# Patient Record
Sex: FEMALE | Race: WHITE | NOT HISPANIC OR LATINO | URBAN - METROPOLITAN AREA
[De-identification: names, ages, dates, MRNs, and addresses within clinical notes are randomized per-mention and may not be internally consistent; named-entity substitution may affect disease eponyms.]

---

## 2017-12-27 ENCOUNTER — EMERGENCY (EMERGENCY)
Facility: HOSPITAL | Age: 37
LOS: 1 days | Discharge: HOME | End: 2017-12-27
Attending: EMERGENCY MEDICINE | Admitting: EMERGENCY MEDICINE
Payer: MEDICAID

## 2017-12-27 VITALS
SYSTOLIC BLOOD PRESSURE: 125 MMHG | HEART RATE: 84 BPM | DIASTOLIC BLOOD PRESSURE: 78 MMHG | TEMPERATURE: 98 F | RESPIRATION RATE: 20 BRPM | OXYGEN SATURATION: 98 %

## 2017-12-27 VITALS
RESPIRATION RATE: 17 BRPM | SYSTOLIC BLOOD PRESSURE: 123 MMHG | OXYGEN SATURATION: 100 % | TEMPERATURE: 99 F | HEART RATE: 77 BPM | DIASTOLIC BLOOD PRESSURE: 75 MMHG

## 2017-12-27 DIAGNOSIS — S20.221A CONTUSION OF RIGHT BACK WALL OF THORAX, INITIAL ENCOUNTER: ICD-10-CM

## 2017-12-27 DIAGNOSIS — S39.92XA UNSPECIFIED INJURY OF LOWER BACK, INITIAL ENCOUNTER: ICD-10-CM

## 2017-12-27 DIAGNOSIS — F11.20 OPIOID DEPENDENCE, UNCOMPLICATED: ICD-10-CM
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
## 2017-12-27 DIAGNOSIS — Z88.0 ALLERGY STATUS TO PENICILLIN: ICD-10-CM

## 2017-12-27 DIAGNOSIS — Z79.899 OTHER LONG TERM (CURRENT) DRUG THERAPY: ICD-10-CM
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
## 2017-12-27 DIAGNOSIS — S32.008A OTHER FRACTURE OF UNSPECIFIED LUMBAR VERTEBRA, INITIAL ENCOUNTER FOR CLOSED FRACTURE: ICD-10-CM

## 2017-12-27 DIAGNOSIS — B19.20 UNSPECIFIED VIRAL HEPATITIS C WITHOUT HEPATIC COMA: ICD-10-CM

## 2017-12-27 DIAGNOSIS — S30.1XXA CONTUSION OF ABDOMINAL WALL, INITIAL ENCOUNTER: ICD-10-CM
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
## 2017-12-27 LAB
ALBUMIN SERPL ELPH-MCNC: 3.6 G/DL — SIGNIFICANT CHANGE UP (ref 3.4–5)
ALP SERPL-CCNC: 66 U/L — SIGNIFICANT CHANGE UP (ref 40–120)
ALT FLD-CCNC: 42 U/L — SIGNIFICANT CHANGE UP (ref 12–42)
ANION GAP SERPL CALC-SCNC: 7 MMOL/L — LOW (ref 9–16)
APPEARANCE UR: CLEAR — SIGNIFICANT CHANGE UP
APTT BLD: 26.8 SEC — LOW (ref 27.5–36.5)
AST SERPL-CCNC: 32 U/L — SIGNIFICANT CHANGE UP (ref 15–37)
BILIRUB SERPL-MCNC: 0.3 MG/DL — SIGNIFICANT CHANGE UP (ref 0.2–1.2)
BILIRUB UR-MCNC: NEGATIVE — SIGNIFICANT CHANGE UP
BUN SERPL-MCNC: 12 MG/DL — SIGNIFICANT CHANGE UP (ref 7–23)
CALCIUM SERPL-MCNC: 9.1 MG/DL — SIGNIFICANT CHANGE UP (ref 8.5–10.5)
CHLORIDE SERPL-SCNC: 104 MMOL/L — SIGNIFICANT CHANGE UP (ref 96–108)
CO2 SERPL-SCNC: 28 MMOL/L — SIGNIFICANT CHANGE UP (ref 22–31)
COLOR SPEC: YELLOW — SIGNIFICANT CHANGE UP
CREAT SERPL-MCNC: 0.98 MG/DL — SIGNIFICANT CHANGE UP (ref 0.5–1.3)
DIFF PNL FLD: NEGATIVE — SIGNIFICANT CHANGE UP
ETHANOL SERPL-MCNC: <3 MG/DL — SIGNIFICANT CHANGE UP
GLUCOSE SERPL-MCNC: 86 MG/DL — SIGNIFICANT CHANGE UP (ref 70–99)
GLUCOSE UR QL: NEGATIVE — SIGNIFICANT CHANGE UP
HCG UR QL: NEGATIVE — SIGNIFICANT CHANGE UP
HCT VFR BLD CALC: 38.3 % — SIGNIFICANT CHANGE UP (ref 34.5–45)
HGB BLD-MCNC: 12.6 G/DL — SIGNIFICANT CHANGE UP (ref 11.5–15.5)
INR BLD: 0.96 — SIGNIFICANT CHANGE UP (ref 0.88–1.16)
KETONES UR-MCNC: NEGATIVE — SIGNIFICANT CHANGE UP
LEUKOCYTE ESTERASE UR-ACNC: NEGATIVE — SIGNIFICANT CHANGE UP
MCHC RBC-ENTMCNC: 27.2 PG — SIGNIFICANT CHANGE UP (ref 27–34)
MCHC RBC-ENTMCNC: 32.9 G/DL — SIGNIFICANT CHANGE UP (ref 32–36)
MCV RBC AUTO: 82.5 FL — SIGNIFICANT CHANGE UP (ref 80–100)
NITRITE UR-MCNC: NEGATIVE — SIGNIFICANT CHANGE UP
PCP SPEC-MCNC: SIGNIFICANT CHANGE UP
PH UR: 6 — SIGNIFICANT CHANGE UP (ref 5–8)
PLATELET # BLD AUTO: 176 K/UL — SIGNIFICANT CHANGE UP (ref 150–400)
POTASSIUM SERPL-MCNC: 4.1 MMOL/L — SIGNIFICANT CHANGE UP (ref 3.5–5.3)
POTASSIUM SERPL-SCNC: 4.1 MMOL/L — SIGNIFICANT CHANGE UP (ref 3.5–5.3)
PROT SERPL-MCNC: 7.2 G/DL — SIGNIFICANT CHANGE UP (ref 6.4–8.2)
PROT UR-MCNC: 30 MG/DL
PROTHROM AB SERPL-ACNC: 10.6 SEC — SIGNIFICANT CHANGE UP (ref 9.8–12.7)
RBC # BLD: 4.64 M/UL — SIGNIFICANT CHANGE UP (ref 3.8–5.2)
RBC # FLD: 13.6 % — SIGNIFICANT CHANGE UP (ref 10.3–16.9)
SODIUM SERPL-SCNC: 139 MMOL/L — SIGNIFICANT CHANGE UP (ref 132–145)
SP GR SPEC: 1.01 — SIGNIFICANT CHANGE UP (ref 1–1.03)
UROBILINOGEN FLD QL: 0.2 E.U./DL — SIGNIFICANT CHANGE UP
WBC # BLD: 7.4 K/UL — SIGNIFICANT CHANGE UP (ref 3.8–10.5)
WBC # FLD AUTO: 7.4 K/UL — SIGNIFICANT CHANGE UP (ref 3.8–10.5)

## 2017-12-27 PROCEDURE — 99285 EMERGENCY DEPT VISIT HI MDM: CPT

## 2017-12-27 PROCEDURE — 74176 CT ABD & PELVIS W/O CONTRAST: CPT | Mod: 26

## 2017-12-27 RX ORDER — IBUPROFEN 200 MG
400 TABLET ORAL ONCE
Qty: 0 | Refills: 0 | Status: COMPLETED | OUTPATIENT
Start: 2017-12-27 | End: 2017-12-27

## 2017-12-27 RX ORDER — IOHEXOL 300 MG/ML
50 INJECTION, SOLUTION INTRAVENOUS ONCE
Qty: 0 | Refills: 0 | Status: COMPLETED | OUTPATIENT
Start: 2017-12-27 | End: 2017-12-27

## 2017-12-27 RX ORDER — SODIUM CHLORIDE 9 MG/ML
1000 INJECTION INTRAMUSCULAR; INTRAVENOUS; SUBCUTANEOUS ONCE
Qty: 0 | Refills: 0 | Status: COMPLETED | OUTPATIENT
Start: 2017-12-27 | End: 2017-12-27

## 2017-12-27 RX ADMIN — IOHEXOL 50 MILLILITER(S): 300 INJECTION, SOLUTION INTRAVENOUS at 14:46

## 2017-12-27 RX ADMIN — Medication 400 MILLIGRAM(S): at 18:06

## 2017-12-27 NOTE — ED ADULT NURSE NOTE - CHIEF COMPLAINT QUOTE
Pt complaining of back pain s/p fall on the train tracks on [place]. Pt states she is homeless and did not want to come to the ED that night. Pt has large bruise to right lower back. Pt takes Methadone daily with benzo use.

## 2017-12-27 NOTE — ED BEHAVIORAL HEALTH NOTE - BEHAVIORAL HEALTH NOTE
Sw was consulted to the Er to speak with the patient and the patients mother. The patient is a 37 year old homeless women with a Hx of benzodiazepine and heroine abuse, takes "[place]atem[place] version of methadone" to help detox from methadone. The patients family was able to get in touch with her and brought her to the Er for her safety and the patient states that she also feel on the subway tracks and has injured her back. The patient and her family are interested in a detox program for the benzodiazepine and the patient only wants to go to [facility] for the program. This worker called the detox program at [place] and the coordinator stated that there was a 50/50 chance that she would get a bed if she went today and that it would be better if she went tomorrow at 7:30am as he mostly would be able to get a bed. This information was shared with the patient and her parents who were thankful for the information. The patient and her family were also provided with a list of other clinics and rehabs that would accept her. Worker made available for any other further assistance needed.

## 2017-12-27 NOTE — ED PROVIDER NOTE - MEDICAL DECISION MAKING DETAILS
Fracture of transverse process of L$ minimally displaced on CT scan, fell on train tracks a few days ago, methadone dependent, social work saw patient and she will go to rehab at 7:30am at [facility].  D/w ortho, outpatient follow up.

## 2017-12-27 NOTE — ED PROVIDER NOTE - OBJECTIVE STATEMENT
Pt is a 36 y/o F (PMHx: Hep c, benzodiazeprene & heroine abuse) c/o fall. Pt is undomiciled, states she [place] the subway tracks on [place] and didn't want to go the hospital because she states "saved money to get a hotel room for the night". Pt reports feeling a snap at impact and reports a burning sensation on her R mid back. Pt has a past Hx of benzodiazepine and heroine abuse, takes "Welsh version of methadone: dolophinexd" and states an allergic reaction to penicillin. Pt is a 36 y/o F (PMHx: Hep c, benzodiazeprene & heroine abuse) c/o fall. Pt is undomiciled, states she fell on the subway tracks on X[place] and didn't want to go the hospital because she states "saved money to get a hotel room for the night". Pt reports feeling a snap at impact and reports a burning sensation on her R mid back. Pt has a past Hx of benzodiazepine and heroine abuse, takes "Colombian version of methadone: Dolophine" and states an allergic reaction to penicillin.

## 2017-12-27 NOTE — ED ADULT NURSE NOTE - OBJECTIVE STATEMENT
Pt came in c/o right back pain s/p trip and fall in train tracks x2days ago, unknown LOC, bystanders pulled her out. Pt reports being homeless and taking methadone daily. Pt reports not being able to go to methadone clinic and last used IV heroin last night. Mother and father are at bedside requesting transfer to detox center. Pt presents to ED sleepy, cooperative, has large bruise on the right flank area.

## 2017-12-27 NOTE — ED ADULT NURSE NOTE - CHPI ED SYMPTOMS NEG
no confusion/no fever/no tingling/no weakness/no loss of consciousness/no abrasion/no bleeding/no deformity/no vomiting/no numbness

## 2017-12-27 NOTE — ED PROVIDER NOTE - CARE PLAN
Principal Discharge DX:	Closed fracture of lumbar vertebra without spinal cord injury, initial encounter  Secondary Diagnosis:	Hepatitis C  Secondary Diagnosis:	Methadone dependence

## 2017-12-27 NOTE — ED ADULT TRIAGE NOTE - CHIEF COMPLAINT QUOTE
Pt complaining of back pain s/p fall on the train tracks on [place] Day. Pt states she is homeless and did not want to come to the ED that night. Pt has large bruise to right lower back. Pt takes Methadone daily with benzo use.

## 2018-02-09 ENCOUNTER — HOSPITAL ENCOUNTER (INPATIENT)
Dept: HOSPITAL 74 - YASAS | Age: 38
LOS: 4 days | Discharge: OTHER INSTITUTION | DRG: 773 | End: 2018-02-13
Attending: INTERNAL MEDICINE | Admitting: INTERNAL MEDICINE
Payer: COMMERCIAL

## 2018-02-09 VITALS — BODY MASS INDEX: 26.6 KG/M2

## 2018-02-09 DIAGNOSIS — F19.24: ICD-10-CM

## 2018-02-09 DIAGNOSIS — G47.00: ICD-10-CM

## 2018-02-09 DIAGNOSIS — F14.20: ICD-10-CM

## 2018-02-09 DIAGNOSIS — F11.20: ICD-10-CM

## 2018-02-09 DIAGNOSIS — F17.210: ICD-10-CM

## 2018-02-09 DIAGNOSIS — Z88.0: ICD-10-CM

## 2018-02-09 DIAGNOSIS — F13.230: Primary | ICD-10-CM

## 2018-02-09 PROCEDURE — HZ2ZZZZ DETOXIFICATION SERVICES FOR SUBSTANCE ABUSE TREATMENT: ICD-10-PCS | Performed by: INTERNAL MEDICINE

## 2018-02-09 RX ADMIN — Medication SCH MG: at 22:39

## 2018-02-09 NOTE — HP
CIWA Score





- CIWA Score


Nausea/Vomitin-Mild Nausea/No Vomiting


Muscle Tremors: 4-Moderate,w/Arms Extend


Anxiety: 4-Mod. Anxious/Guarded


Agitation: 4-Moderately Restless


Paroxysmal Sweats: 1-Minimal Palms Moist


Orientation: 0-Oriented


Tacttile Disturbances: 0-None


Auditory Disturbances: 0-None


Visual Disturbances: 0-None


Headache: 2-Mild


CIWA-Ar Total Score: 16





Admission ROS [facility]





- HPI


Chief Complaint: 





C/O WITHDRAWAL SX'S FROM BENZO'S. SEEKING DETOX TXMENT


Allergies/Adverse Reactions: 


 Allergies











Allergy/AdvReac Type Severity Reaction Status Date / Time


 


buspirone [From BuSpar] Allergy Severe Rash Verified 18 20:05


 


Penicillins Allergy Severe Rash Verified 18 20:05











History of Present Illness: 





37 Y.O. FEMALE WITH LONG HX/O BENZO DEPENDENCE ADMITTED TO DETOX. CLIENT 

REPORTS LAST DETOX A LITTLE OVER A MONTH AGO BUT HAS SINCE RELAPSED. SHE WAS 

REFERRED BY HER MMTP PROGRAM. SHE IS ON METHADONE 100 MG DAILY AT THE [facility] METHADONE PROGRAM/ [facility] TODAY. REPORTS LONGEST CLEAN TIME 14 MONTHS.


Exam Limitations: No Limitations





- Ebola screening


Have you traveled outside of the country in the last 21 days: No


Have you had contact with anyone from an Ebola affected area: No


Have you been sick,other than usual withdrawal symptoms: No


Do you have a fever: No





- Review of Systems


Constitutional: Chills, Loss of Appetite, Malaise, Night Sweats, Unintentional 

Wgt. Loss


EENT: reports: Nose Congestion


Respiratory: reports: No Symptoms reported


Cardiac: reports: No Symptoms Reported


GI: reports: Poor Appetite, Poor Fluid Intake, Abdominal cramping


: reports: No Symptoms Reported


Musculoskeletal: reports: No Symptoms Reported


Integumentary: reports: No Symptoms Reported


Neuro: reports: No Symptoms reported


Endocrine: reports: No Symptoms Reported


Hematology: reports: No Symptoms Reported


Psychiatric: reports: Anxious, Depressed


Other Systems: Reviewed and Negative





Patient History





- Patient Medical History


Hx Anemia: No


Hx Asthma: No


Hx Chronic Obstructive Pulmonary Disease (COPD): No


Hx Cancer: No


Hx Cardiac Disorders: No


Hx Congestive Heart Failure: No


Hx Hypertension: No


Hx Hypercholesterolemia: No


Hx Pacemaker: No


HX Cerebrovascular Accident: No


Hx Seizures: No


Hx Dementia: No


Hx Diabetes: No


Hx Gastrointestinal Disorders: No


Hx Liver Disease: No


Hx Genitourinary Disorders: No


Hx Sexually Transmitted Disorders: No


Hx Renal Disease (ESRD): No


Hx Thyroid Disease: No


Hx Human Immunodeficiency Virus (HIV): No


Hx Hepatitis C: Yes (TX'ED)


Hx Depression: Yes


Hx Suicide Attempt: No


Hx Bipolar Disorder: No


Hx Schizophrenia: No


Other Medical History: ANXIETY





- Patient Surgical History


Past Surgical History: No


Hx Neurologic Surgery: No


Hx Cataract Extraction: No


Hx Cardiac Surgery: No


Hx Lung Surgery: No


Hx Breast Surgery: No


Hx Breast Biopsy: No


Hx Abdominal Surgery: No


Hx Appendectomy: No


Hx Cholecystectomy: No


Hx Genitourinary Surgery: No


Hx  Section: No


Hx Orthopedic Surgery: No


Anesthesia Reaction: No





- PPD History


Previous Implant?: Yes


Documented Results: Negative w/o proof


Implanted On Prior R Admission?: No


PPD to be Administered?: Yes





- Reproductive History


Patient is a Female of Child Bearing Age (11 -55 yrs old): Yes


Last Menstrual Period: 18


LMP comment: IRREG


Patient Pregnant: No (NEG [facility]HCG)





- Smoking Cessation


Smoking history: Current every day smoker


Have you smoked in the past 12 months: Yes


Aproximately how many cigarettes per day: 20


Cigars Per Day: 0


Hx Chewing Tobacco Use: No


Initiated information on smoking cessation: Yes


'Breaking Loose' booklet given: 18





- Substance & Tx. History


Hx Alcohol Use: No


Hx Substance Use: Yes


Substance Use Type: Cocaine, Opiates, Prescribed (MMTP), Tranquilizers (BENZO)


Hx Substance Use Treatment: Yes ([facility])





- Substances Abused


  ** Benzodiazepine (Klonopin)


Route: Oral


Frequency: Daily


Amount used: 10mg


Age of first use: 29


Date of Last Use: 18





  ** Cocaine


Route: Injection


Frequency: Daily


Amount used: 2 bags


Age of first use: 19


Date of Last Use: 18





Family Disease History





- Family Disease History


Family Disease History: Diabetes: Grandparent (HTN), Mother (HTN), Other: 

Father (RECOVERING ADDICT)





Admission Physical Exam [facility]





- Vital Signs


Vital Signs: 


 Vital Signs - 24 hr











  18





  18:04


 


Temperature 98.4 F


 


Pulse Rate 79


 


Respiratory 18





Rate 


 


Blood Pressure 137/72














- Physical


General Appearance: Yes: Disheveled, Mild Distress, Anxious


HEENTM: Yes: EOMI, Normocephalic, Normal Voice, JEF, Pharynx Normal, Nasal 

Congestion


Respiratory: Yes: Chest Non-Tender, Lungs Clear, Normal Breath Sounds, No 

Respiratory Distress, No Accessory Muscle Use


Neck: Yes: No masses,lesions,Nodules, Supple, Trachea in good position


Breast: Yes: Breast Exam Deferred


Cardiology: Yes: Regular Rhythm, Regular Rate, S1, S2


Abdominal: Yes: Normal Bowel Sounds, Non Tender, Flat, Soft


Genitourinary: Yes: Within Normal Limits


Back: Yes: Normal Inspection


Musculoskeletal: Yes: full range of Motion, Gait Steady


Extremities: Yes: Normal Range of Motion, Non-Tender


Neurological: Yes: CNs II-XII NML intact, Fully Oriented, Alert, Motor Strength 

5/5


Integumentary: Yes: Normal Color, Dry, Warm


Lymphatic: Yes: Within Normal Limits





- Diagnostic


(1) Sedative, hypnotic or anxiolytic dependence with withdrawal, uncomplicated


Current Visit: Yes   Status: Chronic   





(2) Cocaine dependence, uncomplicated


Current Visit: Yes   Status: Chronic   





(3) Methadone maintenance therapy patient


Current Visit: Yes   Status: Chronic   





(4) Nicotine dependence


Current Visit: Yes   Status: Chronic   


Qualifiers: 


   Nicotine product type: cigarettes   Substance use status: uncomplicated   

Qualified Code(s): F17.210 - Nicotine dependence, cigarettes, uncomplicated   





Cleared for Admission [facility]





- Detox or Rehab


[facility] Level of Care: Medically Managed


Detox Regimen/Protocol: Valium


Claeared for Rehab Admission: No





[facility] Breath Alcohol Content


Breath Alcohol Content: 0





Urine Pregancy Test





- Result


Urine Pregnancy Test Results: Negative- NO Line Present





Urine Drug Screen





- Results


Drug Screen Negative: No


Urine Drug Screen Results: VIRIDIANA-Cocaine, OPI-Opiates, BZO-Benzodiazepines, MTD-

Methadone

## 2018-02-10 LAB
APPEARANCE UR: (no result)
BILIRUB UR STRIP.AUTO-MCNC: NEGATIVE MG/DL
COLOR UR: YELLOW
EPITH CASTS URNS QL MICRO: (no result) /HPF
KETONES UR QL STRIP: NEGATIVE
LEUKOCYTE ESTERASE UR QL STRIP.AUTO: (no result)
MUCOUS THREADS URNS QL MICRO: (no result)
NITRITE UR QL STRIP: NEGATIVE
PH UR: 5 [PH] (ref 5–8)
PROT UR QL STRIP: NEGATIVE
PROT UR QL STRIP: NEGATIVE
RBC # UR STRIP: (no result) /UL
SP GR UR: 1.02 (ref 1–1.03)
UROBILINOGEN UR STRIP-MCNC: 2 MG/DL (ref 0.2–1)

## 2018-02-10 RX ADMIN — Medication SCH TAB: at 10:49

## 2018-02-10 RX ADMIN — Medication SCH MG: at 22:52

## 2018-02-10 RX ADMIN — NICOTINE POLACRILEX PRN MG: 2 GUM, CHEWING ORAL at 19:00

## 2018-02-10 RX ADMIN — NICOTINE SCH MG: 21 PATCH TRANSDERMAL at 13:33

## 2018-02-10 RX ADMIN — NICOTINE POLACRILEX PRN MG: 2 GUM, CHEWING ORAL at 13:18

## 2018-02-10 RX ADMIN — ZOLPIDEM TARTRATE PRN MG: 5 TABLET, COATED ORAL at 22:52

## 2018-02-10 NOTE — CONSULT
[facility] Psychiatric Consult





- Data


Date of interview: 02/10/18


Admission source: [facility]


Identifying data: Admission to [facility] for this 36 y/o  female 

seeking detox treatment on [address] for opiod,cocaine and benzodiazepine 

dependence.Patient is single without children,homeless,unemployed and supported 

on food stamps.


Substance Abuse History: Confirmed by patient in this session.See current [facility] 

report for details. Smoking history: Current every day smoker.  Have you smoked 

in the past 12 months: Yes.  Aproximately how many cigarettes per day: 20.  

Cigars Per Day: 0.  Hx Chewing Tobacco Use: No.  Initiated information on 

smoking cessation: Yes.  'Breaking Loose' booklet given: 02/09/18.  - Substance 

& Tx. History.  Hx Alcohol Use: No.  Hx Substance Use: Yes.  Substance Use Type

: Cocaine, Opiates, Prescribed (MMTP), Tranquilizers (BENZO).  Hx Substance Use 

Treatment: Yes ([facility]).  - Substances Abused.  ** Benzodiazepine (

Klonopin).  Route: Oral.  Frequency: Daily.  Amount used: 10mg.  Age of first 

use: 29.  Date of Last Use: 02/09/18.  ** Cocaine.  Route: Injection.  Frequency

: Daily.  Amount used: 2 bags.  Age of first use: 19.  Date of Last Use: 02/09/ 18


Medical History: Hepatitis C.


Psychiatric History: No reported history of psychiatric 

hospitalizations.Diagnosed with MDD and Anxiety Disorder.No regular OPD follow 

up (patient gets scripts at discharge from various detox/ rehab facilities)

.Prescribed zoloft 100 mg/day + benadryl for insomnia.Ms Jamarcus yan history 

of suicide attempts.Currently on methadone maintenance (100 mg/day) at the 

[facility].


Physical/Sexual Abuse/Trauma History: Patient denies.


Additional Comment: Urine Drug Screen Results: VIRIDIANA-Cocaine, OPI-Opiates, BZO-

Benzodiazepines, MTD-Methadone.Noted.





Mental Status Exam





- Mental Status Exam


Alert and Oriented to: Time, Place, Person


Cognitive Function: Good


Patient Appearance: Well Groomed


Mood: Nervous, Anxious, Hopeful


Affect: Mood Congruent


Patient Behavior: Fatigued, Appropriate, Cooperative


Speech Pattern: Clear, Appropriate


Voice Loudness: Normal


Thought Process: Intact, Goal Oriented


Thought Disorder: Not Present


Hallucinations: Denies


Suicidal Ideation: Denies


Homicidal Ideation: Denies


Insight/Judgement: Poor


Sleep: Poorly, Difficulty falling asleep


Appetite: Good


Muscle strength/Tone: Normal


Gait/Station: Normal





Psychiatric Findings





- Problem List (Axis 1, 2,3)


(1) Opioid dependence on agonist therapy


Current Visit: Yes   Status: Acute   





(2) Cocaine dependence, uncomplicated


Current Visit: Yes   Status: Acute   





(3) Sedative, hypnotic or anxiolytic dependence with withdrawal, uncomplicated


Current Visit: Yes   Status: Acute   





(4) Nicotine dependence


Current Visit: Yes   Status: Acute   


Qualifiers: 


   Nicotine product type: cigarettes   Substance use status: uncomplicated   

Qualified Code(s): F17.210 - Nicotine dependence, cigarettes, uncomplicated   





(5) Substance induced mood disorder


Current Visit: Yes   Status: Acute   





(6) Insomnia


Current Visit: Yes   Status: Acute   





- Initial Treatment Plan


Initial Treatment Plan: Psychoeducation.Sleep hygiene.Detoxification in 

progress.Medications : zoloft 100 mg po daily + ambien 5 mg po hs.Side effects/

benefits of both drugs are discussed with patient.Ms Ricketts consented (verbally

) to this careplan.Observation.

## 2018-02-10 NOTE — EKG
Test Reason : 

Blood Pressure : ***/*** mmHG

Vent. Rate : 054 BPM     Atrial Rate : 054 BPM

   P-R Int : 110 ms          QRS Dur : 096 ms

    QT Int : 466 ms       P-R-T Axes : 031 028 022 degrees

   QTc Int : 441 ms

 

SINUS BRADYCARDIA WITH SHORT MT

OTHERWISE NORMAL ECG

NO PREVIOUS ECGS AVAILABLE

Confirmed by LUIS EDUARDO GIMENEZ, KANDI ([id number]) on 2/10/2018 3:59:34 PM

 

Referred By:             Confirmed By:KANDI HOFF MD

## 2018-02-10 NOTE — PN
S CIWA





- CIWA Score


Nausea/Vomiting: 3


Muscle Tremors: 3


Anxiety: 3


Agitation: 3


Paroxysmal Sweats: 1-Minimal Palms Moist


Orientation: 0-Oriented


Tacttile Disturbances: 0-None


Auditory Disturbances: 0-None


Visual Disturbances: 0-None


Headache: 0-None Present


CIWA-Ar Total Score: 13





[facility] Progress Note (SOAP)


Subjective: 





Shakes


nausea


Objective: 





A & O x 3,


Ambulating steadily on unit


Anxious


 Vital Signs











Temperature  98.1 F   02/10/18 18:40


 


Pulse Rate  59 L  02/10/18 18:40


 


Respiratory Rate  17   02/10/18 18:40


 


Blood Pressure  113/61   02/10/18 18:40


 


O2 Sat by Pulse Oximetry (%)      








 Laboratory Last Values











Urine Color  Yellow   02/09/18  22:59    


 


Urine Appearance  Slcloudy   02/09/18  22:59    


 


Urine pH  5.0  (5.0-8.0)   02/09/18  22:59    


 


Ur Specific Gravity  1.018  (1.001-1.035)   02/09/18  22:59    


 


Urine Protein  Negative  (NEGATIVE)   02/09/18  22:59    


 


Urine Glucose (UA)  Negative  (NEGATIVE)   02/09/18  22:59    


 


Urine Ketones  Negative  (NEGATIVE)   02/09/18  22:59    


 


Urine Blood  1+  (NEGATIVE)  H  02/09/18  22:59    


 


Urine Nitrite  Negative  (NEGATIVE)   02/09/18  22:59    


 


Urine Bilirubin  Negative  (NEGATIVE)   02/09/18  22:59    


 


Urine Urobilinogen  2.0 mg/dL (0.2-1.0)  H  02/09/18  22:59    


 


Ur Leukocyte Esterase  Trace  (NEGATIVE)   02/09/18  22:59    


 


Urine WBC (Auto)  1 /hpf (3-5)   02/09/18  22:59    


 


Urine RBC (Auto)  6 /hpf (0-3)   02/09/18  22:59    


 


Ur Epithelial Cells  Few /HPF (FEW)   02/09/18  22:59    


 


Urine Mucus  Rare   02/09/18  22:59    








labs noted





Assessment: 





02/10/18 19:52


withdrawal sx


Plan: 





continue detox

## 2018-02-11 LAB
ALBUMIN SERPL-MCNC: 3.4 G/DL (ref 3.4–5)
ALP SERPL-CCNC: 71 U/L (ref 45–117)
ALT SERPL-CCNC: 23 U/L (ref 12–78)
ANION GAP SERPL CALC-SCNC: 4 MMOL/L (ref 8–16)
AST SERPL-CCNC: 21 U/L (ref 15–37)
BILIRUB SERPL-MCNC: 0.5 MG/DL (ref 0.2–1)
BUN SERPL-MCNC: 9 MG/DL (ref 7–18)
CALCIUM SERPL-MCNC: 8.4 MG/DL (ref 8.5–10.1)
CHLORIDE SERPL-SCNC: 105 MMOL/L (ref 98–107)
CO2 SERPL-SCNC: 31 MMOL/L (ref 21–32)
CREAT SERPL-MCNC: 1 MG/DL (ref 0.55–1.02)
DEPRECATED RDW RBC AUTO: 15.4 % (ref 11.6–15.6)
GLUCOSE SERPL-MCNC: 85 MG/DL (ref 74–106)
HCT VFR BLD CALC: 38 % (ref 32.4–45.2)
HGB BLD-MCNC: 12.2 GM/DL (ref 10.7–15.3)
MCH RBC QN AUTO: 26.6 PG (ref 25.7–33.7)
MCHC RBC AUTO-ENTMCNC: 32 G/DL (ref 32–36)
MCV RBC: 82.9 FL (ref 80–96)
PLATELET # BLD AUTO: 163 K/MM3 (ref 134–434)
PMV BLD: 8.8 FL (ref 7.5–11.1)
POTASSIUM SERPLBLD-SCNC: 4.3 MMOL/L (ref 3.5–5.1)
PROT SERPL-MCNC: 6.3 G/DL (ref 6.4–8.2)
RBC # BLD AUTO: 4.58 M/MM3 (ref 3.6–5.2)
SODIUM SERPL-SCNC: 140 MMOL/L (ref 136–145)
WBC # BLD AUTO: 5.2 K/MM3 (ref 4–10)

## 2018-02-11 RX ADMIN — METHADONE HYDROCHLORIDE SCH MG: 40 TABLET ORAL at 06:24

## 2018-02-11 RX ADMIN — ZOLPIDEM TARTRATE PRN MG: 5 TABLET, COATED ORAL at 22:45

## 2018-02-11 RX ADMIN — IBUPROFEN PRN MG: 400 TABLET, FILM COATED ORAL at 12:54

## 2018-02-11 RX ADMIN — Medication SCH TAB: at 10:45

## 2018-02-11 RX ADMIN — NICOTINE POLACRILEX PRN MG: 2 GUM, CHEWING ORAL at 10:47

## 2018-02-11 RX ADMIN — NICOTINE POLACRILEX PRN MG: 2 GUM, CHEWING ORAL at 15:04

## 2018-02-11 RX ADMIN — NICOTINE SCH MG: 21 PATCH TRANSDERMAL at 10:46

## 2018-02-11 RX ADMIN — Medication SCH MG: at 22:45

## 2018-02-11 RX ADMIN — SERTRALINE HYDROCHLORIDE SCH MG: 50 TABLET ORAL at 10:45

## 2018-02-11 RX ADMIN — NICOTINE POLACRILEX PRN MG: 2 GUM, CHEWING ORAL at 07:58

## 2018-02-11 NOTE — PN
[facility] CIWA





- CIWA Score


Nausea/Vomitin-No Nausea/No Vomiting


Muscle Tremors: 3


Anxiety: 3


Agitation: 3


Paroxysmal Sweats: 1-Minimal Palms Moist


Orientation: 0-Oriented


Tacttile Disturbances: 0-None


Auditory Disturbances: 0-None


Visual Disturbances: 0-None


Headache: 0-None Present


CIWA-Ar Total Score: 10





[facility] Progress Note (SOAP)


Subjective: 





sweat


tremor


anxiety


Objective: 





18 11:57


 Vital Signs











Temperature  98.1 F   18 10:21


 


Pulse Rate  49 L  18 10:21


 


Respiratory Rate  20   18 10:21


 


Blood Pressure  119/68   18 10:21


 


O2 Sat by Pulse Oximetry (%)      








 Laboratory Last Values











WBC  5.2 K/mm3 (4.0-10.0)   18  08:00    


 


RBC  4.58 M/mm3 (3.60-5.2)   18  08:00    


 


Hgb  12.2 GM/dL (10.7-15.3)   18  08:00    


 


Hct  38.0 % (32.4-45.2)   18  08:00    


 


MCV  82.9 fl (80-96)   18  08:00    


 


MCH  26.6 pg (25.7-33.7)   18  08:00    


 


MCHC  32.0 g/dl (32.0-36.0)   18  08:00    


 


RDW  15.4 % (11.6-15.6)   18  08:00    


 


Plt Count  163 K/MM3 (134-434)   18  08:00    


 


MPV  8.8 fl (7.5-11.1)   18  08:00    


 


Sodium  140 mmol/L (136-145)   18  08:00    


 


Potassium  4.3 mmol/L (3.5-5.1)   18  08:00    


 


Chloride  105 mmol/L ()   18  08:00    


 


Carbon Dioxide  31 mmol/L (21-32)   18  08:00    


 


Anion Gap  4  (8-16)  L  18  08:00    


 


BUN  9 mg/dL (7-18)   18  08:00    


 


Creatinine  1.0 mg/dL (0.55-1.02)   18  08:00    


 


Creat Clearance w eGFR  > 60  (>60)   18  08:00    


 


Random Glucose  85 mg/dL ()   18  08:00    


 


Calcium  8.4 mg/dL (8.5-10.1)  L  18  08:00    


 


Total Bilirubin  0.5 mg/dL (0.2-1.0)   18  08:00    


 


AST  21 U/L (15-37)   18  08:00    


 


ALT  23 U/L (12-78)   18  08:00    


 


Alkaline Phosphatase  71 U/L ()   18  08:00    


 


Total Protein  6.3 g/dl (6.4-8.2)  L  18  08:00    


 


Albumin  3.4 g/dl (3.4-5.0)   18  08:00    


 


Urine Color  Yellow   18  22:59    


 


Urine Appearance  Slcloudy   18  22:59    


 


Urine pH  5.0  (5.0-8.0)   18  22:59    


 


Ur Specific Gravity  1.018  (1.001-1.035)   18  22:59    


 


Urine Protein  Negative  (NEGATIVE)   18  22:59    


 


Urine Glucose (UA)  Negative  (NEGATIVE)   18  22:59    


 


Urine Ketones  Negative  (NEGATIVE)   18  22:59    


 


Urine Blood  1+  (NEGATIVE)  H  18  22:59    


 


Urine Nitrite  Negative  (NEGATIVE)   18  22:59    


 


Urine Bilirubin  Negative  (NEGATIVE)   18  22:59    


 


Urine Urobilinogen  2.0 mg/dL (0.2-1.0)  H  18  22:59    


 


Ur Leukocyte Esterase  Trace  (NEGATIVE)   18  22:59    


 


Urine WBC (Auto)  1 /hpf (3-5)   18  22:59    


 


Urine RBC (Auto)  6 /hpf (0-3)   18  22:59    


 


Ur Epithelial Cells  Few /HPF (FEW)   18  22:59    


 


Urine Mucus  Rare   18  22:59    








lab noted


Assessment: 





18 11:57


withdrawal sx


Plan: 





continue detox

## 2018-02-12 RX ADMIN — NICOTINE POLACRILEX PRN MG: 2 GUM, CHEWING ORAL at 10:59

## 2018-02-12 RX ADMIN — Medication SCH MG: at 22:28

## 2018-02-12 RX ADMIN — NICOTINE POLACRILEX PRN MG: 2 GUM, CHEWING ORAL at 08:40

## 2018-02-12 RX ADMIN — METHADONE HYDROCHLORIDE SCH MG: 40 TABLET ORAL at 05:30

## 2018-02-12 RX ADMIN — NICOTINE SCH MG: 21 PATCH TRANSDERMAL at 10:57

## 2018-02-12 RX ADMIN — Medication SCH TAB: at 10:57

## 2018-02-12 RX ADMIN — NICOTINE POLACRILEX PRN MG: 2 GUM, CHEWING ORAL at 12:59

## 2018-02-12 RX ADMIN — ZOLPIDEM TARTRATE PRN MG: 5 TABLET, COATED ORAL at 22:28

## 2018-02-12 RX ADMIN — SERTRALINE HYDROCHLORIDE SCH MG: 50 TABLET ORAL at 10:57

## 2018-02-12 RX ADMIN — NICOTINE POLACRILEX PRN MG: 2 GUM, CHEWING ORAL at 17:56

## 2018-02-12 NOTE — PN
[facility] Progress Note (SOAP)


Subjective: 





mild alcohol withdrawal sx


mild sweat


less anxiousness


no tremor





Objective: 





02/12/18 08:54


 Vital Signs











Temperature  97.7 F   02/12/18 06:38


 


Pulse Rate  50 L  02/12/18 06:38


 


Respiratory Rate  18   02/12/18 06:38


 


Blood Pressure  116/76   02/12/18 06:38


 


O2 Sat by Pulse Oximetry (%)      








 Laboratory Last Values











WBC  5.2 K/mm3 (4.0-10.0)   02/11/18  08:00    


 


RBC  4.58 M/mm3 (3.60-5.2)   02/11/18  08:00    


 


Hgb  12.2 GM/dL (10.7-15.3)   02/11/18  08:00    


 


Hct  38.0 % (32.4-45.2)   02/11/18  08:00    


 


MCV  82.9 fl (80-96)   02/11/18  08:00    


 


MCH  26.6 pg (25.7-33.7)   02/11/18  08:00    


 


MCHC  32.0 g/dl (32.0-36.0)   02/11/18  08:00    


 


RDW  15.4 % (11.6-15.6)   02/11/18  08:00    


 


Plt Count  163 K/MM3 (134-434)   02/11/18  08:00    


 


MPV  8.8 fl (7.5-11.1)   02/11/18  08:00    


 


Sodium  140 mmol/L (136-145)   02/11/18  08:00    


 


Potassium  4.3 mmol/L (3.5-5.1)   02/11/18  08:00    


 


Chloride  105 mmol/L ()   02/11/18  08:00    


 


Carbon Dioxide  31 mmol/L (21-32)   02/11/18  08:00    


 


Anion Gap  4  (8-16)  L  02/11/18  08:00    


 


BUN  9 mg/dL (7-18)   02/11/18  08:00    


 


Creatinine  1.0 mg/dL (0.55-1.02)   02/11/18  08:00    


 


Creat Clearance w eGFR  > 60  (>60)   02/11/18  08:00    


 


Random Glucose  85 mg/dL ()   02/11/18  08:00    


 


Calcium  8.4 mg/dL (8.5-10.1)  L  02/11/18  08:00    


 


Total Bilirubin  0.5 mg/dL (0.2-1.0)   02/11/18  08:00    


 


AST  21 U/L (15-37)   02/11/18  08:00    


 


ALT  23 U/L (12-78)   02/11/18  08:00    


 


Alkaline Phosphatase  71 U/L ()   02/11/18  08:00    


 


Total Protein  6.3 g/dl (6.4-8.2)  L  02/11/18  08:00    


 


Albumin  3.4 g/dl (3.4-5.0)   02/11/18  08:00    


 


Urine Color  Yellow   02/09/18  22:59    


 


Urine Appearance  Slcloudy   02/09/18  22:59    


 


Urine pH  5.0  (5.0-8.0)   02/09/18  22:59    


 


Ur Specific Gravity  1.018  (1.001-1.035)   02/09/18  22:59    


 


Urine Protein  Negative  (NEGATIVE)   02/09/18  22:59    


 


Urine Glucose (UA)  Negative  (NEGATIVE)   02/09/18  22:59    


 


Urine Ketones  Negative  (NEGATIVE)   02/09/18  22:59    


 


Urine Blood  1+  (NEGATIVE)  H  02/09/18  22:59    


 


Urine Nitrite  Negative  (NEGATIVE)   02/09/18  22:59    


 


Urine Bilirubin  Negative  (NEGATIVE)   02/09/18  22:59    


 


Urine Urobilinogen  2.0 mg/dL (0.2-1.0)  H  02/09/18  22:59    


 


Ur Leukocyte Esterase  Trace  (NEGATIVE)   02/09/18  22:59    


 


Urine WBC (Auto)  1 /hpf (3-5)   02/09/18  22:59    


 


Urine RBC (Auto)  6 /hpf (0-3)   02/09/18  22:59    


 


Ur Epithelial Cells  Few /HPF (FEW)   02/09/18  22:59    


 


Urine Mucus  Rare   02/09/18  22:59    


 


RPR Titer  Nonreactive  (NONREACTIVE)   02/11/18  08:00    








lab noted


Assessment: 





02/12/18 08:57


mild withdrawal sx


Plan: 





medically supervised detox

## 2018-02-13 ENCOUNTER — HOSPITAL ENCOUNTER (INPATIENT)
Dept: HOSPITAL 74 - YASAS | Age: 38
LOS: 28 days | Discharge: HOME | DRG: 772 | End: 2018-03-13
Attending: PSYCHIATRY & NEUROLOGY | Admitting: PSYCHIATRY & NEUROLOGY
Payer: COMMERCIAL

## 2018-02-13 VITALS — SYSTOLIC BLOOD PRESSURE: 127 MMHG | TEMPERATURE: 97.7 F | HEART RATE: 50 BPM | DIASTOLIC BLOOD PRESSURE: 60 MMHG

## 2018-02-13 DIAGNOSIS — F19.24: ICD-10-CM

## 2018-02-13 DIAGNOSIS — F13.20: Primary | ICD-10-CM

## 2018-02-13 DIAGNOSIS — G47.00: ICD-10-CM

## 2018-02-13 DIAGNOSIS — F19.282: ICD-10-CM

## 2018-02-13 DIAGNOSIS — F11.20: ICD-10-CM

## 2018-02-13 DIAGNOSIS — F17.210: ICD-10-CM

## 2018-02-13 DIAGNOSIS — F33.9: ICD-10-CM

## 2018-02-13 PROCEDURE — HZ42ZZZ GROUP COUNSELING FOR SUBSTANCE ABUSE TREATMENT, COGNITIVE-BEHAVIORAL: ICD-10-PCS | Performed by: PSYCHIATRY & NEUROLOGY

## 2018-02-13 RX ADMIN — NICOTINE POLACRILEX PRN MG: 2 GUM, CHEWING ORAL at 12:36

## 2018-02-13 RX ADMIN — SERTRALINE HYDROCHLORIDE SCH MG: 50 TABLET ORAL at 09:47

## 2018-02-13 RX ADMIN — NICOTINE POLACRILEX PRN MG: 4 GUM, CHEWING ORAL at 21:34

## 2018-02-13 RX ADMIN — METHADONE HYDROCHLORIDE SCH MG: 40 TABLET ORAL at 06:06

## 2018-02-13 RX ADMIN — IBUPROFEN PRN MG: 400 TABLET, FILM COATED ORAL at 21:33

## 2018-02-13 RX ADMIN — Medication SCH TAB: at 09:47

## 2018-02-13 RX ADMIN — HYDROXYZINE PAMOATE PRN MG: 50 CAPSULE ORAL at 21:33

## 2018-02-13 RX ADMIN — NICOTINE POLACRILEX PRN MG: 2 GUM, CHEWING ORAL at 06:13

## 2018-02-13 RX ADMIN — Medication SCH MG: at 21:33

## 2018-02-13 RX ADMIN — IBUPROFEN PRN MG: 400 TABLET, FILM COATED ORAL at 06:13

## 2018-02-13 RX ADMIN — NICOTINE POLACRILEX PRN MG: 2 GUM, CHEWING ORAL at 09:51

## 2018-02-13 RX ADMIN — NICOTINE SCH MG: 21 PATCH TRANSDERMAL at 09:48

## 2018-02-14 RX ADMIN — Medication SCH MG: at 21:25

## 2018-02-14 RX ADMIN — NICOTINE SCH MG: 21 PATCH TRANSDERMAL at 09:56

## 2018-02-14 RX ADMIN — HYDROXYZINE PAMOATE PRN MG: 50 CAPSULE ORAL at 21:25

## 2018-02-14 RX ADMIN — NICOTINE POLACRILEX PRN MG: 4 GUM, CHEWING ORAL at 21:52

## 2018-02-14 RX ADMIN — SERTRALINE HYDROCHLORIDE SCH MG: 50 TABLET ORAL at 09:57

## 2018-02-14 RX ADMIN — NICOTINE POLACRILEX PRN MG: 4 GUM, CHEWING ORAL at 15:46

## 2018-02-14 RX ADMIN — Medication SCH TAB: at 09:56

## 2018-02-14 RX ADMIN — IBUPROFEN PRN MG: 400 TABLET, FILM COATED ORAL at 15:45

## 2018-02-14 RX ADMIN — NICOTINE POLACRILEX PRN MG: 4 GUM, CHEWING ORAL at 08:45

## 2018-02-14 RX ADMIN — NICOTINE POLACRILEX PRN MG: 4 GUM, CHEWING ORAL at 19:27

## 2018-02-14 RX ADMIN — METHADONE HYDROCHLORIDE SCH MG: 40 TABLET ORAL at 06:01
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
## 2018-02-15 RX ADMIN — NAPROXEN SCH MG: 500 TABLET ORAL at 22:03

## 2018-02-15 RX ADMIN — NICOTINE POLACRILEX PRN MG: 4 GUM, CHEWING ORAL at 20:35

## 2018-02-15 RX ADMIN — Medication SCH MG: at 22:04

## 2018-02-15 RX ADMIN — CYCLOBENZAPRINE HYDROCHLORIDE SCH MG: 10 TABLET, FILM COATED ORAL at 22:03

## 2018-02-15 RX ADMIN — SERTRALINE HYDROCHLORIDE SCH MG: 50 TABLET ORAL at 10:04

## 2018-02-15 RX ADMIN — NICOTINE SCH MG: 21 PATCH TRANSDERMAL at 10:05

## 2018-02-15 RX ADMIN — METHADONE HYDROCHLORIDE SCH MG: 40 TABLET ORAL at 05:56

## 2018-02-15 RX ADMIN — NICOTINE POLACRILEX PRN MG: 4 GUM, CHEWING ORAL at 13:12

## 2018-02-15 RX ADMIN — SUVOREXANT PRN MG: 5 TABLET, FILM COATED ORAL at 22:04

## 2018-02-15 RX ADMIN — NAPROXEN SCH MG: 500 TABLET ORAL at 13:10

## 2018-02-15 RX ADMIN — CYCLOBENZAPRINE HYDROCHLORIDE SCH MG: 10 TABLET, FILM COATED ORAL at 13:10

## 2018-02-15 RX ADMIN — Medication SCH TAB: at 10:05

## 2018-02-15 RX ADMIN — NICOTINE POLACRILEX PRN MG: 4 GUM, CHEWING ORAL at 05:57

## 2018-02-15 RX ADMIN — HYDROXYZINE PAMOATE PRN MG: 50 CAPSULE ORAL at 22:04

## 2018-02-15 RX ADMIN — PANTOPRAZOLE SODIUM SCH MG: 40 TABLET, DELAYED RELEASE ORAL at 13:10

## 2018-02-15 RX ADMIN — NICOTINE POLACRILEX PRN MG: 4 GUM, CHEWING ORAL at 10:05

## 2018-02-15 NOTE — PN
BHS Progress Note (SOAP)


Subjective: 





body aches and pains from detox, requesting flexeril


Objective: 





02/15/18 12:17


 Vital Signs - 24 hr











  02/15/18





  06:52


 


Temperature 98.6 F


 


Pulse Rate 50 L


 


Respiratory 16





Rate 


 


Blood Pressure 121/75








labs reveiwed


Assessment: 





02/15/18 12:17


body aches and pains - withdrawwal sx , start fleseriland naprosyn for pain 

around the clock with protonix.

## 2018-02-16 RX ADMIN — CYCLOBENZAPRINE HYDROCHLORIDE SCH MG: 10 TABLET, FILM COATED ORAL at 05:47

## 2018-02-16 RX ADMIN — SERTRALINE HYDROCHLORIDE SCH MG: 50 TABLET ORAL at 10:24

## 2018-02-16 RX ADMIN — HYDROXYZINE PAMOATE PRN MG: 50 CAPSULE ORAL at 21:44

## 2018-02-16 RX ADMIN — NICOTINE POLACRILEX PRN MG: 4 GUM, CHEWING ORAL at 09:06

## 2018-02-16 RX ADMIN — NICOTINE SCH MG: 21 PATCH TRANSDERMAL at 10:24

## 2018-02-16 RX ADMIN — PANTOPRAZOLE SODIUM SCH MG: 40 TABLET, DELAYED RELEASE ORAL at 10:24

## 2018-02-16 RX ADMIN — NAPROXEN SCH MG: 500 TABLET ORAL at 10:24

## 2018-02-16 RX ADMIN — NAPROXEN SCH MG: 500 TABLET ORAL at 21:44

## 2018-02-16 RX ADMIN — NICOTINE POLACRILEX PRN MG: 4 GUM, CHEWING ORAL at 21:45

## 2018-02-16 RX ADMIN — CYCLOBENZAPRINE HYDROCHLORIDE SCH MG: 10 TABLET, FILM COATED ORAL at 13:14

## 2018-02-16 RX ADMIN — Medication SCH MG: at 21:44

## 2018-02-16 RX ADMIN — Medication SCH TAB: at 10:24

## 2018-02-16 RX ADMIN — NICOTINE POLACRILEX PRN MG: 4 GUM, CHEWING ORAL at 13:14

## 2018-02-16 RX ADMIN — METHADONE HYDROCHLORIDE SCH MG: 40 TABLET ORAL at 05:47

## 2018-02-16 RX ADMIN — NICOTINE POLACRILEX PRN MG: 4 GUM, CHEWING ORAL at 17:56

## 2018-02-16 RX ADMIN — CYCLOBENZAPRINE HYDROCHLORIDE SCH MG: 10 TABLET, FILM COATED ORAL at 21:44

## 2018-02-17 RX ADMIN — SUVOREXANT PRN MG: 5 TABLET, FILM COATED ORAL at 21:22

## 2018-02-17 RX ADMIN — NAPROXEN SCH MG: 500 TABLET ORAL at 10:03

## 2018-02-17 RX ADMIN — NICOTINE POLACRILEX PRN MG: 4 GUM, CHEWING ORAL at 22:50

## 2018-02-17 RX ADMIN — NICOTINE POLACRILEX PRN MG: 4 GUM, CHEWING ORAL at 14:31

## 2018-02-17 RX ADMIN — IBUPROFEN PRN MG: 400 TABLET, FILM COATED ORAL at 06:13

## 2018-02-17 RX ADMIN — CYCLOBENZAPRINE HYDROCHLORIDE SCH MG: 10 TABLET, FILM COATED ORAL at 21:22

## 2018-02-17 RX ADMIN — SERTRALINE HYDROCHLORIDE SCH MG: 50 TABLET ORAL at 10:03

## 2018-02-17 RX ADMIN — Medication SCH MG: at 21:22

## 2018-02-17 RX ADMIN — PANTOPRAZOLE SODIUM SCH MG: 40 TABLET, DELAYED RELEASE ORAL at 10:03

## 2018-02-17 RX ADMIN — CYCLOBENZAPRINE HYDROCHLORIDE SCH MG: 10 TABLET, FILM COATED ORAL at 06:14

## 2018-02-17 RX ADMIN — Medication SCH TAB: at 10:03

## 2018-02-17 RX ADMIN — NICOTINE POLACRILEX PRN MG: 4 GUM, CHEWING ORAL at 20:06

## 2018-02-17 RX ADMIN — HYDROXYZINE PAMOATE PRN MG: 50 CAPSULE ORAL at 21:22

## 2018-02-17 RX ADMIN — CYCLOBENZAPRINE HYDROCHLORIDE SCH MG: 10 TABLET, FILM COATED ORAL at 14:30

## 2018-02-17 RX ADMIN — NAPROXEN SCH MG: 500 TABLET ORAL at 21:22

## 2018-02-17 RX ADMIN — METHADONE HYDROCHLORIDE SCH MG: 40 TABLET ORAL at 06:13

## 2018-02-17 RX ADMIN — NICOTINE SCH MG: 21 PATCH TRANSDERMAL at 10:03

## 2018-02-17 RX ADMIN — NICOTINE POLACRILEX PRN MG: 4 GUM, CHEWING ORAL at 10:05

## 2018-02-18 RX ADMIN — NICOTINE POLACRILEX PRN MG: 4 GUM, CHEWING ORAL at 21:46

## 2018-02-18 RX ADMIN — METHADONE HYDROCHLORIDE SCH MG: 40 TABLET ORAL at 06:03

## 2018-02-18 RX ADMIN — SERTRALINE HYDROCHLORIDE SCH MG: 50 TABLET ORAL at 10:01

## 2018-02-18 RX ADMIN — Medication SCH TAB: at 10:01

## 2018-02-18 RX ADMIN — CYCLOBENZAPRINE HYDROCHLORIDE SCH MG: 10 TABLET, FILM COATED ORAL at 21:45

## 2018-02-18 RX ADMIN — HYDROXYZINE PAMOATE PRN MG: 50 CAPSULE ORAL at 21:46

## 2018-02-18 RX ADMIN — CYCLOBENZAPRINE HYDROCHLORIDE SCH MG: 10 TABLET, FILM COATED ORAL at 06:03

## 2018-02-18 RX ADMIN — NICOTINE SCH MG: 21 PATCH TRANSDERMAL at 10:01

## 2018-02-18 RX ADMIN — Medication SCH MG: at 21:45

## 2018-02-18 RX ADMIN — PANTOPRAZOLE SODIUM SCH MG: 40 TABLET, DELAYED RELEASE ORAL at 10:01

## 2018-02-18 RX ADMIN — NAPROXEN SCH MG: 500 TABLET ORAL at 10:01

## 2018-02-18 RX ADMIN — NICOTINE POLACRILEX PRN MG: 4 GUM, CHEWING ORAL at 13:11

## 2018-02-18 RX ADMIN — CYCLOBENZAPRINE HYDROCHLORIDE SCH MG: 10 TABLET, FILM COATED ORAL at 13:10

## 2018-02-18 RX ADMIN — NAPROXEN SCH MG: 500 TABLET ORAL at 21:45

## 2018-02-18 RX ADMIN — NICOTINE POLACRILEX PRN MG: 4 GUM, CHEWING ORAL at 10:02

## 2018-02-19 RX ADMIN — CYCLOBENZAPRINE HYDROCHLORIDE SCH MG: 10 TABLET, FILM COATED ORAL at 06:08

## 2018-02-19 RX ADMIN — METHADONE HYDROCHLORIDE SCH MG: 40 TABLET ORAL at 06:08

## 2018-02-19 RX ADMIN — NAPROXEN SCH MG: 500 TABLET ORAL at 21:15

## 2018-02-19 RX ADMIN — HYDROXYZINE PAMOATE PRN MG: 50 CAPSULE ORAL at 21:15

## 2018-02-19 RX ADMIN — NAPROXEN SCH MG: 500 TABLET ORAL at 09:08

## 2018-02-19 RX ADMIN — CYCLOBENZAPRINE HYDROCHLORIDE SCH MG: 10 TABLET, FILM COATED ORAL at 13:06

## 2018-02-19 RX ADMIN — CYCLOBENZAPRINE HYDROCHLORIDE SCH MG: 10 TABLET, FILM COATED ORAL at 21:15

## 2018-02-19 RX ADMIN — Medication SCH MG: at 21:15

## 2018-02-19 RX ADMIN — SERTRALINE HYDROCHLORIDE SCH MG: 50 TABLET ORAL at 09:09

## 2018-02-19 RX ADMIN — Medication SCH TAB: at 09:08

## 2018-02-19 RX ADMIN — PANTOPRAZOLE SODIUM SCH MG: 40 TABLET, DELAYED RELEASE ORAL at 09:09

## 2018-02-19 RX ADMIN — NICOTINE SCH MG: 21 PATCH TRANSDERMAL at 09:09

## 2018-02-20 RX ADMIN — SERTRALINE HYDROCHLORIDE SCH MG: 50 TABLET ORAL at 09:57

## 2018-02-20 RX ADMIN — NICOTINE POLACRILEX PRN MG: 4 GUM, CHEWING ORAL at 09:58

## 2018-02-20 RX ADMIN — NAPROXEN SCH MG: 500 TABLET ORAL at 09:57

## 2018-02-20 RX ADMIN — NICOTINE POLACRILEX PRN MG: 4 GUM, CHEWING ORAL at 19:22

## 2018-02-20 RX ADMIN — NICOTINE SCH MG: 21 PATCH TRANSDERMAL at 09:57

## 2018-02-20 RX ADMIN — NICOTINE POLACRILEX PRN MG: 4 GUM, CHEWING ORAL at 13:22

## 2018-02-20 RX ADMIN — CYCLOBENZAPRINE HYDROCHLORIDE SCH MG: 10 TABLET, FILM COATED ORAL at 06:17

## 2018-02-20 RX ADMIN — METHADONE HYDROCHLORIDE SCH MG: 40 TABLET ORAL at 06:17

## 2018-02-20 RX ADMIN — NAPROXEN SCH MG: 500 TABLET ORAL at 21:12

## 2018-02-20 RX ADMIN — DOCUSATE SODIUM SCH MG: 100 CAPSULE, LIQUID FILLED ORAL at 13:22

## 2018-02-20 RX ADMIN — CYCLOBENZAPRINE HYDROCHLORIDE SCH MG: 10 TABLET, FILM COATED ORAL at 21:12

## 2018-02-20 RX ADMIN — SENNOSIDES SCH TAB: 8.6 TABLET, FILM COATED ORAL at 21:13

## 2018-02-20 RX ADMIN — HYDROXYZINE PAMOATE PRN MG: 50 CAPSULE ORAL at 21:14

## 2018-02-20 RX ADMIN — CYCLOBENZAPRINE HYDROCHLORIDE SCH MG: 10 TABLET, FILM COATED ORAL at 13:22

## 2018-02-20 RX ADMIN — Medication SCH MG: at 21:12

## 2018-02-20 RX ADMIN — DOCUSATE SODIUM SCH MG: 100 CAPSULE, LIQUID FILLED ORAL at 21:12

## 2018-02-20 RX ADMIN — PANTOPRAZOLE SODIUM SCH MG: 40 TABLET, DELAYED RELEASE ORAL at 09:57

## 2018-02-20 RX ADMIN — Medication SCH TAB: at 09:57

## 2018-02-20 NOTE — PN
BHS Progress Note (SOAP)


Subjective: 





I am having difficulty with going to the bathroom I need colace


Objective: 





02/20/18 11:44


 Vital Signs











Temperature  98.2 F   02/20/18 06:45


 


Pulse Rate  62   02/20/18 06:45


 


Respiratory Rate  18   02/20/18 06:45


 


Blood Pressure  122/74   02/20/18 06:45


 


O2 Sat by Pulse Oximetry (%)      








aaox3


ambulating


no acute distress


Assessment: 





02/20/18 11:44


abdomen assessed, belly soft,  +BS all four quadrants.


Plan: 





colace 100mg TID


senna tabs qhs


increase fluids, water pitcher ordered.

## 2018-02-21 RX ADMIN — METHADONE HYDROCHLORIDE SCH MG: 40 TABLET ORAL at 06:04

## 2018-02-21 RX ADMIN — Medication SCH TAB: at 09:51

## 2018-02-21 RX ADMIN — CYCLOBENZAPRINE HYDROCHLORIDE SCH MG: 10 TABLET, FILM COATED ORAL at 06:04

## 2018-02-21 RX ADMIN — Medication SCH MG: at 21:02

## 2018-02-21 RX ADMIN — SERTRALINE HYDROCHLORIDE SCH MG: 50 TABLET ORAL at 09:51

## 2018-02-21 RX ADMIN — DOCUSATE SODIUM SCH MG: 100 CAPSULE, LIQUID FILLED ORAL at 21:02

## 2018-02-21 RX ADMIN — NICOTINE SCH MG: 21 PATCH TRANSDERMAL at 09:51

## 2018-02-21 RX ADMIN — DOCUSATE SODIUM SCH MG: 100 CAPSULE, LIQUID FILLED ORAL at 06:04

## 2018-02-21 RX ADMIN — HYDROXYZINE PAMOATE PRN MG: 50 CAPSULE ORAL at 21:02

## 2018-02-21 RX ADMIN — NAPROXEN SCH MG: 500 TABLET ORAL at 21:04

## 2018-02-21 RX ADMIN — CYCLOBENZAPRINE HYDROCHLORIDE SCH MG: 10 TABLET, FILM COATED ORAL at 21:02

## 2018-02-21 RX ADMIN — NICOTINE POLACRILEX PRN MG: 4 GUM, CHEWING ORAL at 09:00

## 2018-02-21 RX ADMIN — NAPROXEN SCH MG: 500 TABLET ORAL at 09:51

## 2018-02-21 RX ADMIN — PANTOPRAZOLE SODIUM SCH MG: 40 TABLET, DELAYED RELEASE ORAL at 09:51

## 2018-02-21 RX ADMIN — DOCUSATE SODIUM SCH MG: 100 CAPSULE, LIQUID FILLED ORAL at 14:17

## 2018-02-21 RX ADMIN — CYCLOBENZAPRINE HYDROCHLORIDE SCH MG: 10 TABLET, FILM COATED ORAL at 14:17

## 2018-02-21 RX ADMIN — HYDROXYZINE PAMOATE PRN MG: 50 CAPSULE ORAL at 09:53

## 2018-02-21 RX ADMIN — SENNOSIDES SCH TAB: 8.6 TABLET, FILM COATED ORAL at 21:02

## 2018-02-22 RX ADMIN — HYDROXYZINE PAMOATE PRN MG: 50 CAPSULE ORAL at 21:13

## 2018-02-22 RX ADMIN — NICOTINE POLACRILEX PRN MG: 4 GUM, CHEWING ORAL at 09:48

## 2018-02-22 RX ADMIN — DOCUSATE SODIUM SCH MG: 100 CAPSULE, LIQUID FILLED ORAL at 06:07

## 2018-02-22 RX ADMIN — CYCLOBENZAPRINE HYDROCHLORIDE SCH MG: 10 TABLET, FILM COATED ORAL at 13:29

## 2018-02-22 RX ADMIN — NAPROXEN SCH MG: 500 TABLET ORAL at 21:11

## 2018-02-22 RX ADMIN — MAGNESIUM HYDROXIDE PRN ML: 400 SUSPENSION ORAL at 06:11

## 2018-02-22 RX ADMIN — PANTOPRAZOLE SODIUM SCH MG: 40 TABLET, DELAYED RELEASE ORAL at 09:46

## 2018-02-22 RX ADMIN — NICOTINE SCH MG: 21 PATCH TRANSDERMAL at 09:45

## 2018-02-22 RX ADMIN — Medication SCH MG: at 21:11

## 2018-02-22 RX ADMIN — HYDROXYZINE PAMOATE PRN MG: 50 CAPSULE ORAL at 09:47

## 2018-02-22 RX ADMIN — CYCLOBENZAPRINE HYDROCHLORIDE SCH MG: 10 TABLET, FILM COATED ORAL at 21:11

## 2018-02-22 RX ADMIN — SENNOSIDES SCH TAB: 8.6 TABLET, FILM COATED ORAL at 21:11

## 2018-02-22 RX ADMIN — SERTRALINE HYDROCHLORIDE SCH MG: 50 TABLET ORAL at 09:45

## 2018-02-22 RX ADMIN — NICOTINE POLACRILEX PRN MG: 4 GUM, CHEWING ORAL at 21:13

## 2018-02-22 RX ADMIN — METHADONE HYDROCHLORIDE SCH MG: 40 TABLET ORAL at 06:06

## 2018-02-22 RX ADMIN — CYCLOBENZAPRINE HYDROCHLORIDE SCH MG: 10 TABLET, FILM COATED ORAL at 06:07

## 2018-02-22 RX ADMIN — Medication SCH TAB: at 09:45

## 2018-02-22 RX ADMIN — NICOTINE POLACRILEX PRN MG: 4 GUM, CHEWING ORAL at 13:29

## 2018-02-22 RX ADMIN — DOCUSATE SODIUM SCH MG: 100 CAPSULE, LIQUID FILLED ORAL at 13:29

## 2018-02-22 RX ADMIN — DOCUSATE SODIUM SCH MG: 100 CAPSULE, LIQUID FILLED ORAL at 21:11

## 2018-02-22 RX ADMIN — NAPROXEN SCH MG: 500 TABLET ORAL at 09:45

## 2018-02-23 RX ADMIN — Medication SCH TAB: at 09:57

## 2018-02-23 RX ADMIN — CYCLOBENZAPRINE HYDROCHLORIDE SCH MG: 10 TABLET, FILM COATED ORAL at 06:09

## 2018-02-23 RX ADMIN — HYDROXYZINE PAMOATE PRN MG: 50 CAPSULE ORAL at 21:33

## 2018-02-23 RX ADMIN — NICOTINE SCH MG: 21 PATCH TRANSDERMAL at 09:57

## 2018-02-23 RX ADMIN — METHADONE HYDROCHLORIDE SCH MG: 40 TABLET ORAL at 06:09

## 2018-02-23 RX ADMIN — Medication SCH MG: at 21:33

## 2018-02-23 RX ADMIN — DOCUSATE SODIUM SCH MG: 100 CAPSULE, LIQUID FILLED ORAL at 21:33

## 2018-02-23 RX ADMIN — CYCLOBENZAPRINE HYDROCHLORIDE SCH MG: 10 TABLET, FILM COATED ORAL at 13:38

## 2018-02-23 RX ADMIN — CYCLOBENZAPRINE HYDROCHLORIDE SCH MG: 10 TABLET, FILM COATED ORAL at 21:33

## 2018-02-23 RX ADMIN — SENNOSIDES SCH TAB: 8.6 TABLET, FILM COATED ORAL at 21:33

## 2018-02-23 RX ADMIN — PANTOPRAZOLE SODIUM SCH MG: 40 TABLET, DELAYED RELEASE ORAL at 09:57

## 2018-02-23 RX ADMIN — NICOTINE POLACRILEX PRN MG: 4 GUM, CHEWING ORAL at 13:39

## 2018-02-23 RX ADMIN — NAPROXEN SCH MG: 500 TABLET ORAL at 09:57

## 2018-02-23 RX ADMIN — SERTRALINE HYDROCHLORIDE SCH MG: 50 TABLET ORAL at 09:57

## 2018-02-23 RX ADMIN — NAPROXEN SCH MG: 500 TABLET ORAL at 21:33

## 2018-02-23 RX ADMIN — NICOTINE POLACRILEX PRN MG: 4 GUM, CHEWING ORAL at 09:04

## 2018-02-23 RX ADMIN — DOCUSATE SODIUM SCH MG: 100 CAPSULE, LIQUID FILLED ORAL at 13:38

## 2018-02-23 RX ADMIN — DOCUSATE SODIUM SCH MG: 100 CAPSULE, LIQUID FILLED ORAL at 06:09

## 2018-02-24 RX ADMIN — SENNOSIDES SCH TAB: 8.6 TABLET, FILM COATED ORAL at 21:39

## 2018-02-24 RX ADMIN — CYCLOBENZAPRINE HYDROCHLORIDE SCH MG: 10 TABLET, FILM COATED ORAL at 21:40

## 2018-02-24 RX ADMIN — PANTOPRAZOLE SODIUM SCH MG: 40 TABLET, DELAYED RELEASE ORAL at 09:47

## 2018-02-24 RX ADMIN — DOCUSATE SODIUM SCH MG: 100 CAPSULE, LIQUID FILLED ORAL at 13:39

## 2018-02-24 RX ADMIN — METHADONE HYDROCHLORIDE SCH MG: 40 TABLET ORAL at 06:09

## 2018-02-24 RX ADMIN — DOCUSATE SODIUM SCH MG: 100 CAPSULE, LIQUID FILLED ORAL at 06:09

## 2018-02-24 RX ADMIN — CYCLOBENZAPRINE HYDROCHLORIDE SCH MG: 10 TABLET, FILM COATED ORAL at 13:39

## 2018-02-24 RX ADMIN — HYDROXYZINE PAMOATE PRN MG: 50 CAPSULE ORAL at 21:39

## 2018-02-24 RX ADMIN — Medication SCH TAB: at 09:47

## 2018-02-24 RX ADMIN — NICOTINE POLACRILEX PRN MG: 4 GUM, CHEWING ORAL at 13:40

## 2018-02-24 RX ADMIN — CYCLOBENZAPRINE HYDROCHLORIDE SCH MG: 10 TABLET, FILM COATED ORAL at 06:09

## 2018-02-24 RX ADMIN — NAPROXEN SCH MG: 500 TABLET ORAL at 09:47

## 2018-02-24 RX ADMIN — NAPROXEN SCH MG: 500 TABLET ORAL at 21:40

## 2018-02-24 RX ADMIN — SERTRALINE HYDROCHLORIDE SCH MG: 50 TABLET ORAL at 09:47

## 2018-02-24 RX ADMIN — DOCUSATE SODIUM SCH MG: 100 CAPSULE, LIQUID FILLED ORAL at 21:39

## 2018-02-24 RX ADMIN — Medication SCH MG: at 21:40

## 2018-02-24 RX ADMIN — HYDROXYZINE PAMOATE PRN MG: 50 CAPSULE ORAL at 14:13

## 2018-02-24 RX ADMIN — NICOTINE POLACRILEX PRN MG: 4 GUM, CHEWING ORAL at 09:48

## 2018-02-24 RX ADMIN — NICOTINE SCH MG: 21 PATCH TRANSDERMAL at 09:47

## 2018-02-25 RX ADMIN — DOCUSATE SODIUM SCH MG: 100 CAPSULE, LIQUID FILLED ORAL at 06:01

## 2018-02-25 RX ADMIN — HYDROXYZINE PAMOATE PRN MG: 50 CAPSULE ORAL at 13:12

## 2018-02-25 RX ADMIN — NICOTINE POLACRILEX PRN MG: 4 GUM, CHEWING ORAL at 19:27

## 2018-02-25 RX ADMIN — NICOTINE POLACRILEX PRN MG: 4 GUM, CHEWING ORAL at 09:02

## 2018-02-25 RX ADMIN — SERTRALINE HYDROCHLORIDE SCH MG: 50 TABLET ORAL at 09:00

## 2018-02-25 RX ADMIN — DOCUSATE SODIUM SCH MG: 100 CAPSULE, LIQUID FILLED ORAL at 21:29

## 2018-02-25 RX ADMIN — DOCUSATE SODIUM SCH MG: 100 CAPSULE, LIQUID FILLED ORAL at 13:12

## 2018-02-25 RX ADMIN — HYDROXYZINE PAMOATE PRN MG: 50 CAPSULE ORAL at 21:30

## 2018-02-25 RX ADMIN — Medication SCH TAB: at 09:00

## 2018-02-25 RX ADMIN — SENNOSIDES SCH TAB: 8.6 TABLET, FILM COATED ORAL at 21:29

## 2018-02-25 RX ADMIN — CYCLOBENZAPRINE HYDROCHLORIDE SCH MG: 10 TABLET, FILM COATED ORAL at 06:01

## 2018-02-25 RX ADMIN — PANTOPRAZOLE SODIUM SCH MG: 40 TABLET, DELAYED RELEASE ORAL at 09:00

## 2018-02-25 RX ADMIN — NAPROXEN SCH MG: 500 TABLET ORAL at 09:00

## 2018-02-25 RX ADMIN — CYCLOBENZAPRINE HYDROCHLORIDE SCH MG: 10 TABLET, FILM COATED ORAL at 13:12

## 2018-02-25 RX ADMIN — METHADONE HYDROCHLORIDE SCH MG: 40 TABLET ORAL at 06:01

## 2018-02-25 RX ADMIN — NAPROXEN SCH MG: 500 TABLET ORAL at 21:29

## 2018-02-25 RX ADMIN — Medication SCH MG: at 21:29

## 2018-02-25 RX ADMIN — NICOTINE POLACRILEX PRN MG: 4 GUM, CHEWING ORAL at 13:13

## 2018-02-25 RX ADMIN — NICOTINE SCH MG: 21 PATCH TRANSDERMAL at 09:00

## 2018-02-25 RX ADMIN — CYCLOBENZAPRINE HYDROCHLORIDE SCH MG: 10 TABLET, FILM COATED ORAL at 21:29

## 2018-02-26 RX ADMIN — METHADONE HYDROCHLORIDE SCH MG: 40 TABLET ORAL at 05:53

## 2018-02-26 RX ADMIN — DOCUSATE SODIUM SCH MG: 100 CAPSULE, LIQUID FILLED ORAL at 05:52

## 2018-02-26 RX ADMIN — CYCLOBENZAPRINE HYDROCHLORIDE SCH MG: 10 TABLET, FILM COATED ORAL at 05:52

## 2018-02-26 RX ADMIN — SERTRALINE HYDROCHLORIDE SCH MG: 50 TABLET ORAL at 09:44

## 2018-02-26 RX ADMIN — NAPROXEN SCH MG: 500 TABLET ORAL at 21:17

## 2018-02-26 RX ADMIN — Medication SCH MG: at 21:17

## 2018-02-26 RX ADMIN — NICOTINE POLACRILEX PRN MG: 4 GUM, CHEWING ORAL at 14:09

## 2018-02-26 RX ADMIN — DOCUSATE SODIUM SCH MG: 100 CAPSULE, LIQUID FILLED ORAL at 21:17

## 2018-02-26 RX ADMIN — PANTOPRAZOLE SODIUM SCH MG: 40 TABLET, DELAYED RELEASE ORAL at 09:45

## 2018-02-26 RX ADMIN — NAPROXEN SCH MG: 500 TABLET ORAL at 09:45

## 2018-02-26 RX ADMIN — NICOTINE SCH MG: 21 PATCH TRANSDERMAL at 09:44

## 2018-02-26 RX ADMIN — DOCUSATE SODIUM SCH MG: 100 CAPSULE, LIQUID FILLED ORAL at 14:09

## 2018-02-26 RX ADMIN — SENNOSIDES SCH TAB: 8.6 TABLET, FILM COATED ORAL at 21:17

## 2018-02-26 RX ADMIN — HYDROXYZINE PAMOATE PRN MG: 50 CAPSULE ORAL at 21:17

## 2018-02-26 RX ADMIN — CYCLOBENZAPRINE HYDROCHLORIDE SCH MG: 10 TABLET, FILM COATED ORAL at 21:17

## 2018-02-26 RX ADMIN — Medication SCH TAB: at 09:45

## 2018-02-26 RX ADMIN — NICOTINE POLACRILEX PRN MG: 4 GUM, CHEWING ORAL at 21:17

## 2018-02-26 RX ADMIN — CYCLOBENZAPRINE HYDROCHLORIDE SCH MG: 10 TABLET, FILM COATED ORAL at 14:09

## 2018-02-27 RX ADMIN — CYCLOBENZAPRINE HYDROCHLORIDE SCH MG: 10 TABLET, FILM COATED ORAL at 13:45

## 2018-02-27 RX ADMIN — Medication SCH MG: at 21:20

## 2018-02-27 RX ADMIN — CYCLOBENZAPRINE HYDROCHLORIDE SCH MG: 10 TABLET, FILM COATED ORAL at 06:09

## 2018-02-27 RX ADMIN — DOCUSATE SODIUM SCH MG: 100 CAPSULE, LIQUID FILLED ORAL at 21:20

## 2018-02-27 RX ADMIN — PANTOPRAZOLE SODIUM SCH MG: 40 TABLET, DELAYED RELEASE ORAL at 09:50

## 2018-02-27 RX ADMIN — NAPROXEN SCH MG: 500 TABLET ORAL at 21:20

## 2018-02-27 RX ADMIN — HYDROXYZINE PAMOATE PRN MG: 50 CAPSULE ORAL at 21:20

## 2018-02-27 RX ADMIN — NICOTINE POLACRILEX PRN MG: 4 GUM, CHEWING ORAL at 09:50

## 2018-02-27 RX ADMIN — DOCUSATE SODIUM SCH MG: 100 CAPSULE, LIQUID FILLED ORAL at 13:45

## 2018-02-27 RX ADMIN — NICOTINE POLACRILEX PRN MG: 4 GUM, CHEWING ORAL at 13:46

## 2018-02-27 RX ADMIN — HYDROXYZINE PAMOATE PRN MG: 50 CAPSULE ORAL at 13:45

## 2018-02-27 RX ADMIN — NAPROXEN SCH MG: 500 TABLET ORAL at 09:49

## 2018-02-27 RX ADMIN — SENNOSIDES SCH TAB: 8.6 TABLET, FILM COATED ORAL at 21:20

## 2018-02-27 RX ADMIN — DOCUSATE SODIUM SCH MG: 100 CAPSULE, LIQUID FILLED ORAL at 06:08

## 2018-02-27 RX ADMIN — METHADONE HYDROCHLORIDE SCH MG: 40 TABLET ORAL at 06:09

## 2018-02-27 RX ADMIN — NICOTINE SCH MG: 21 PATCH TRANSDERMAL at 09:49

## 2018-02-27 RX ADMIN — SERTRALINE HYDROCHLORIDE SCH MG: 50 TABLET ORAL at 09:50

## 2018-02-27 RX ADMIN — CYCLOBENZAPRINE HYDROCHLORIDE SCH MG: 10 TABLET, FILM COATED ORAL at 21:20

## 2018-02-27 RX ADMIN — Medication SCH TAB: at 09:49

## 2018-02-28 RX ADMIN — DOCUSATE SODIUM SCH MG: 100 CAPSULE, LIQUID FILLED ORAL at 13:28

## 2018-02-28 RX ADMIN — Medication SCH TAB: at 09:07

## 2018-02-28 RX ADMIN — CYCLOBENZAPRINE HYDROCHLORIDE SCH MG: 10 TABLET, FILM COATED ORAL at 06:31

## 2018-02-28 RX ADMIN — Medication SCH MG: at 21:19

## 2018-02-28 RX ADMIN — DOCUSATE SODIUM SCH MG: 100 CAPSULE, LIQUID FILLED ORAL at 06:31

## 2018-02-28 RX ADMIN — NICOTINE POLACRILEX PRN MG: 4 GUM, CHEWING ORAL at 21:19

## 2018-02-28 RX ADMIN — NICOTINE POLACRILEX PRN MG: 4 GUM, CHEWING ORAL at 13:28

## 2018-02-28 RX ADMIN — NAPROXEN SCH MG: 500 TABLET ORAL at 21:19

## 2018-02-28 RX ADMIN — CYCLOBENZAPRINE HYDROCHLORIDE SCH MG: 10 TABLET, FILM COATED ORAL at 13:28

## 2018-02-28 RX ADMIN — CYCLOBENZAPRINE HYDROCHLORIDE SCH MG: 10 TABLET, FILM COATED ORAL at 21:19

## 2018-02-28 RX ADMIN — DOCUSATE SODIUM SCH MG: 100 CAPSULE, LIQUID FILLED ORAL at 21:19

## 2018-02-28 RX ADMIN — PANTOPRAZOLE SODIUM SCH MG: 40 TABLET, DELAYED RELEASE ORAL at 09:07

## 2018-02-28 RX ADMIN — SERTRALINE HYDROCHLORIDE SCH MG: 50 TABLET ORAL at 09:07

## 2018-02-28 RX ADMIN — NAPROXEN SCH MG: 500 TABLET ORAL at 09:07

## 2018-02-28 RX ADMIN — NICOTINE SCH MG: 21 PATCH TRANSDERMAL at 09:07

## 2018-02-28 RX ADMIN — METHADONE HYDROCHLORIDE SCH MG: 40 TABLET ORAL at 06:32

## 2018-02-28 RX ADMIN — NICOTINE POLACRILEX PRN MG: 4 GUM, CHEWING ORAL at 09:09

## 2018-02-28 RX ADMIN — HYDROXYZINE PAMOATE PRN MG: 50 CAPSULE ORAL at 21:19

## 2018-02-28 RX ADMIN — SENNOSIDES SCH TAB: 8.6 TABLET, FILM COATED ORAL at 21:19

## 2018-02-28 NOTE — PN
BHS Progress Note


Note: 





Patient requested increase in current methadone maintenance dose of 100 mg qd. 

Patient reports no withdrawal symptoms . 


Patient AO x 3, in no apparent distress , ambulating in the unit. 


Patient to follow up with out patient [facility] for requested dose adjustment 


Continue rehab

## 2018-03-01 RX ADMIN — HYDROXYZINE PAMOATE PRN MG: 50 CAPSULE ORAL at 21:28

## 2018-03-01 RX ADMIN — Medication SCH TAB: at 09:39

## 2018-03-01 RX ADMIN — NAPROXEN SCH MG: 500 TABLET ORAL at 21:27

## 2018-03-01 RX ADMIN — SENNOSIDES SCH TAB: 8.6 TABLET, FILM COATED ORAL at 21:28

## 2018-03-01 RX ADMIN — NAPROXEN SCH MG: 500 TABLET ORAL at 09:39

## 2018-03-01 RX ADMIN — METHADONE HYDROCHLORIDE SCH MG: 40 TABLET ORAL at 06:21

## 2018-03-01 RX ADMIN — CYCLOBENZAPRINE HYDROCHLORIDE SCH MG: 10 TABLET, FILM COATED ORAL at 21:27

## 2018-03-01 RX ADMIN — SERTRALINE HYDROCHLORIDE SCH MG: 50 TABLET ORAL at 09:39

## 2018-03-01 RX ADMIN — NICOTINE SCH MG: 21 PATCH TRANSDERMAL at 09:39

## 2018-03-01 RX ADMIN — NICOTINE POLACRILEX PRN MG: 4 GUM, CHEWING ORAL at 08:57

## 2018-03-01 RX ADMIN — NICOTINE POLACRILEX PRN MG: 4 GUM, CHEWING ORAL at 14:47

## 2018-03-01 RX ADMIN — Medication SCH MG: at 21:27

## 2018-03-01 RX ADMIN — DOCUSATE SODIUM SCH MG: 100 CAPSULE, LIQUID FILLED ORAL at 21:27

## 2018-03-01 RX ADMIN — CYCLOBENZAPRINE HYDROCHLORIDE SCH MG: 10 TABLET, FILM COATED ORAL at 06:22

## 2018-03-01 RX ADMIN — DOCUSATE SODIUM SCH MG: 100 CAPSULE, LIQUID FILLED ORAL at 06:22

## 2018-03-01 RX ADMIN — PANTOPRAZOLE SODIUM SCH MG: 40 TABLET, DELAYED RELEASE ORAL at 09:39

## 2018-03-01 RX ADMIN — CYCLOBENZAPRINE HYDROCHLORIDE SCH MG: 10 TABLET, FILM COATED ORAL at 14:47

## 2018-03-01 RX ADMIN — DOCUSATE SODIUM SCH MG: 100 CAPSULE, LIQUID FILLED ORAL at 14:47

## 2018-03-02 RX ADMIN — HYDROXYZINE PAMOATE PRN MG: 50 CAPSULE ORAL at 21:16

## 2018-03-02 RX ADMIN — DOCUSATE SODIUM SCH MG: 100 CAPSULE, LIQUID FILLED ORAL at 21:16

## 2018-03-02 RX ADMIN — NICOTINE POLACRILEX PRN MG: 4 GUM, CHEWING ORAL at 09:52

## 2018-03-02 RX ADMIN — NAPROXEN SCH MG: 500 TABLET ORAL at 09:50

## 2018-03-02 RX ADMIN — DOCUSATE SODIUM SCH MG: 100 CAPSULE, LIQUID FILLED ORAL at 06:01

## 2018-03-02 RX ADMIN — SENNOSIDES SCH TAB: 8.6 TABLET, FILM COATED ORAL at 21:16

## 2018-03-02 RX ADMIN — CYCLOBENZAPRINE HYDROCHLORIDE SCH MG: 10 TABLET, FILM COATED ORAL at 14:13

## 2018-03-02 RX ADMIN — Medication SCH TAB: at 09:50

## 2018-03-02 RX ADMIN — NAPROXEN SCH MG: 500 TABLET ORAL at 21:17

## 2018-03-02 RX ADMIN — CYCLOBENZAPRINE HYDROCHLORIDE SCH MG: 10 TABLET, FILM COATED ORAL at 21:16

## 2018-03-02 RX ADMIN — NICOTINE POLACRILEX PRN MG: 4 GUM, CHEWING ORAL at 14:14

## 2018-03-02 RX ADMIN — METHADONE HYDROCHLORIDE SCH MG: 40 TABLET ORAL at 06:01

## 2018-03-02 RX ADMIN — PANTOPRAZOLE SODIUM SCH MG: 40 TABLET, DELAYED RELEASE ORAL at 09:50

## 2018-03-02 RX ADMIN — CYCLOBENZAPRINE HYDROCHLORIDE SCH MG: 10 TABLET, FILM COATED ORAL at 06:01

## 2018-03-02 RX ADMIN — NICOTINE SCH MG: 21 PATCH TRANSDERMAL at 09:50

## 2018-03-02 RX ADMIN — SERTRALINE HYDROCHLORIDE SCH MG: 50 TABLET ORAL at 09:50

## 2018-03-02 RX ADMIN — Medication SCH MG: at 21:16

## 2018-03-02 RX ADMIN — DOCUSATE SODIUM SCH MG: 100 CAPSULE, LIQUID FILLED ORAL at 14:13

## 2018-03-03 RX ADMIN — NAPROXEN SCH MG: 500 TABLET ORAL at 09:39

## 2018-03-03 RX ADMIN — DOCUSATE SODIUM SCH MG: 100 CAPSULE, LIQUID FILLED ORAL at 14:39

## 2018-03-03 RX ADMIN — CYCLOBENZAPRINE HYDROCHLORIDE SCH MG: 10 TABLET, FILM COATED ORAL at 06:05

## 2018-03-03 RX ADMIN — DOCUSATE SODIUM SCH MG: 100 CAPSULE, LIQUID FILLED ORAL at 21:17

## 2018-03-03 RX ADMIN — SERTRALINE HYDROCHLORIDE SCH MG: 50 TABLET ORAL at 09:39

## 2018-03-03 RX ADMIN — PANTOPRAZOLE SODIUM SCH MG: 40 TABLET, DELAYED RELEASE ORAL at 09:39

## 2018-03-03 RX ADMIN — MAGNESIUM HYDROXIDE PRN ML: 400 SUSPENSION ORAL at 09:40

## 2018-03-03 RX ADMIN — HYDROXYZINE PAMOATE PRN MG: 50 CAPSULE ORAL at 21:17

## 2018-03-03 RX ADMIN — METHADONE HYDROCHLORIDE SCH MG: 40 TABLET ORAL at 06:05

## 2018-03-03 RX ADMIN — DOCUSATE SODIUM SCH MG: 100 CAPSULE, LIQUID FILLED ORAL at 06:05

## 2018-03-03 RX ADMIN — NICOTINE POLACRILEX PRN MG: 4 GUM, CHEWING ORAL at 09:41

## 2018-03-03 RX ADMIN — Medication SCH TAB: at 09:40

## 2018-03-03 RX ADMIN — NICOTINE POLACRILEX PRN MG: 4 GUM, CHEWING ORAL at 14:39

## 2018-03-03 RX ADMIN — NAPROXEN SCH MG: 500 TABLET ORAL at 21:17

## 2018-03-03 RX ADMIN — CYCLOBENZAPRINE HYDROCHLORIDE SCH MG: 10 TABLET, FILM COATED ORAL at 14:39

## 2018-03-03 RX ADMIN — Medication SCH MG: at 21:16

## 2018-03-03 RX ADMIN — CYCLOBENZAPRINE HYDROCHLORIDE SCH MG: 10 TABLET, FILM COATED ORAL at 21:17

## 2018-03-03 RX ADMIN — NICOTINE SCH MG: 21 PATCH TRANSDERMAL at 09:39

## 2018-03-03 RX ADMIN — SENNOSIDES SCH TAB: 8.6 TABLET, FILM COATED ORAL at 21:16

## 2018-03-04 RX ADMIN — Medication SCH MG: at 21:18

## 2018-03-04 RX ADMIN — METHADONE HYDROCHLORIDE SCH MG: 40 TABLET ORAL at 06:15

## 2018-03-04 RX ADMIN — HYDROXYZINE PAMOATE PRN MG: 50 CAPSULE ORAL at 15:04

## 2018-03-04 RX ADMIN — HYDROXYZINE PAMOATE PRN MG: 50 CAPSULE ORAL at 21:18

## 2018-03-04 RX ADMIN — MAGNESIUM HYDROXIDE PRN ML: 400 SUSPENSION ORAL at 09:40

## 2018-03-04 RX ADMIN — DOCUSATE SODIUM SCH MG: 100 CAPSULE, LIQUID FILLED ORAL at 13:08

## 2018-03-04 RX ADMIN — PANTOPRAZOLE SODIUM SCH MG: 40 TABLET, DELAYED RELEASE ORAL at 09:38

## 2018-03-04 RX ADMIN — NAPROXEN SCH MG: 500 TABLET ORAL at 09:38

## 2018-03-04 RX ADMIN — NICOTINE POLACRILEX PRN MG: 4 GUM, CHEWING ORAL at 13:11

## 2018-03-04 RX ADMIN — DOCUSATE SODIUM SCH MG: 100 CAPSULE, LIQUID FILLED ORAL at 06:14

## 2018-03-04 RX ADMIN — CYCLOBENZAPRINE HYDROCHLORIDE SCH MG: 10 TABLET, FILM COATED ORAL at 06:15

## 2018-03-04 RX ADMIN — DOCUSATE SODIUM SCH MG: 100 CAPSULE, LIQUID FILLED ORAL at 21:18

## 2018-03-04 RX ADMIN — NICOTINE POLACRILEX PRN MG: 4 GUM, CHEWING ORAL at 09:41

## 2018-03-04 RX ADMIN — NAPROXEN SCH MG: 500 TABLET ORAL at 21:18

## 2018-03-04 RX ADMIN — Medication SCH TAB: at 09:38

## 2018-03-04 RX ADMIN — SENNOSIDES SCH TAB: 8.6 TABLET, FILM COATED ORAL at 21:18

## 2018-03-04 RX ADMIN — NICOTINE SCH MG: 21 PATCH TRANSDERMAL at 09:38

## 2018-03-04 RX ADMIN — SERTRALINE HYDROCHLORIDE SCH MG: 50 TABLET ORAL at 09:38

## 2018-03-04 RX ADMIN — CYCLOBENZAPRINE HYDROCHLORIDE SCH MG: 10 TABLET, FILM COATED ORAL at 13:08

## 2018-03-04 RX ADMIN — CYCLOBENZAPRINE HYDROCHLORIDE SCH MG: 10 TABLET, FILM COATED ORAL at 21:18

## 2018-03-05 RX ADMIN — Medication SCH MG: at 21:18

## 2018-03-05 RX ADMIN — DOCUSATE SODIUM SCH MG: 100 CAPSULE, LIQUID FILLED ORAL at 21:19

## 2018-03-05 RX ADMIN — NICOTINE POLACRILEX PRN MG: 4 GUM, CHEWING ORAL at 21:19

## 2018-03-05 RX ADMIN — SENNOSIDES SCH TAB: 8.6 TABLET, FILM COATED ORAL at 21:19

## 2018-03-05 RX ADMIN — PANTOPRAZOLE SODIUM SCH MG: 40 TABLET, DELAYED RELEASE ORAL at 10:22

## 2018-03-05 RX ADMIN — CYCLOBENZAPRINE HYDROCHLORIDE SCH MG: 10 TABLET, FILM COATED ORAL at 14:46

## 2018-03-05 RX ADMIN — DOCUSATE SODIUM SCH MG: 100 CAPSULE, LIQUID FILLED ORAL at 06:14

## 2018-03-05 RX ADMIN — NAPROXEN SCH MG: 500 TABLET ORAL at 10:22

## 2018-03-05 RX ADMIN — HYDROXYZINE PAMOATE PRN MG: 50 CAPSULE ORAL at 14:46

## 2018-03-05 RX ADMIN — NAPROXEN SCH MG: 500 TABLET ORAL at 21:18

## 2018-03-05 RX ADMIN — NICOTINE SCH MG: 21 PATCH TRANSDERMAL at 10:22

## 2018-03-05 RX ADMIN — NICOTINE POLACRILEX PRN MG: 4 GUM, CHEWING ORAL at 08:58

## 2018-03-05 RX ADMIN — METHADONE HYDROCHLORIDE SCH MG: 40 TABLET ORAL at 06:14

## 2018-03-05 RX ADMIN — HYDROXYZINE PAMOATE PRN MG: 50 CAPSULE ORAL at 21:19

## 2018-03-05 RX ADMIN — NICOTINE POLACRILEX PRN MG: 4 GUM, CHEWING ORAL at 14:47

## 2018-03-05 RX ADMIN — DOCUSATE SODIUM SCH MG: 100 CAPSULE, LIQUID FILLED ORAL at 14:46

## 2018-03-05 RX ADMIN — SERTRALINE HYDROCHLORIDE SCH MG: 50 TABLET ORAL at 10:22

## 2018-03-05 RX ADMIN — CYCLOBENZAPRINE HYDROCHLORIDE SCH MG: 10 TABLET, FILM COATED ORAL at 21:18

## 2018-03-05 RX ADMIN — CYCLOBENZAPRINE HYDROCHLORIDE SCH MG: 10 TABLET, FILM COATED ORAL at 06:14

## 2018-03-05 RX ADMIN — Medication SCH TAB: at 10:22

## 2018-03-06 RX ADMIN — Medication SCH TAB: at 09:01

## 2018-03-06 RX ADMIN — NICOTINE SCH MG: 21 PATCH TRANSDERMAL at 09:00

## 2018-03-06 RX ADMIN — NICOTINE POLACRILEX PRN MG: 4 GUM, CHEWING ORAL at 14:50

## 2018-03-06 RX ADMIN — NAPROXEN SCH MG: 500 TABLET ORAL at 21:39

## 2018-03-06 RX ADMIN — PANTOPRAZOLE SODIUM SCH MG: 40 TABLET, DELAYED RELEASE ORAL at 09:01

## 2018-03-06 RX ADMIN — DOCUSATE SODIUM SCH MG: 100 CAPSULE, LIQUID FILLED ORAL at 06:07

## 2018-03-06 RX ADMIN — HYDROXYZINE PAMOATE PRN MG: 50 CAPSULE ORAL at 21:39

## 2018-03-06 RX ADMIN — NICOTINE POLACRILEX PRN MG: 4 GUM, CHEWING ORAL at 09:01

## 2018-03-06 RX ADMIN — DOCUSATE SODIUM SCH MG: 100 CAPSULE, LIQUID FILLED ORAL at 14:49

## 2018-03-06 RX ADMIN — CYCLOBENZAPRINE HYDROCHLORIDE SCH MG: 10 TABLET, FILM COATED ORAL at 14:49

## 2018-03-06 RX ADMIN — NAPROXEN SCH MG: 500 TABLET ORAL at 09:01

## 2018-03-06 RX ADMIN — METHADONE HYDROCHLORIDE SCH MG: 40 TABLET ORAL at 06:07

## 2018-03-06 RX ADMIN — NICOTINE POLACRILEX PRN MG: 4 GUM, CHEWING ORAL at 21:41

## 2018-03-06 RX ADMIN — SERTRALINE HYDROCHLORIDE SCH MG: 50 TABLET ORAL at 09:00

## 2018-03-06 RX ADMIN — Medication SCH MG: at 21:39

## 2018-03-06 RX ADMIN — HYDROXYZINE PAMOATE PRN MG: 50 CAPSULE ORAL at 14:49

## 2018-03-06 RX ADMIN — CYCLOBENZAPRINE HYDROCHLORIDE SCH MG: 10 TABLET, FILM COATED ORAL at 21:39

## 2018-03-06 RX ADMIN — DOCUSATE SODIUM SCH MG: 100 CAPSULE, LIQUID FILLED ORAL at 21:39

## 2018-03-06 RX ADMIN — CYCLOBENZAPRINE HYDROCHLORIDE SCH MG: 10 TABLET, FILM COATED ORAL at 06:07

## 2018-03-06 RX ADMIN — SENNOSIDES SCH TAB: 8.6 TABLET, FILM COATED ORAL at 21:39

## 2018-03-06 NOTE — PN
Psychiatric Progress Note


Vital Signs: 


 Vital Signs











 Period  Temp  Pulse  Resp  BP Sys/Rahman  Pulse Ox


 


 Last 24 Hr      16    











Current Medications: 


Active Medications











Generic Name Dose Route Start Last Admin





  Trade Name Freq  PRN Reason Stop Dose Admin


 


Acetaminophen  650 mg  02/13/18 15:20  





  Tylenol -  PO   





  Q4H PRN   





  FEVER   


 


Al Hydroxide/Mg Hydroxide  30 ml  02/13/18 15:20  





  Mylanta Oral Suspension -  PO   





  Q6H PRN   





  DYSPEPSIA   


 


Cyclobenzaprine HCl  10 mg  02/15/18 14:00  03/06/18 06:07





  Flexeril -  PO   10 mg





  TID KELLI   Administration


 


Docusate Sodium  100 mg  02/20/18 14:00  03/06/18 06:07





  Colace -  PO   100 mg





  TID KELLI   Administration


 


Eucalyptus/Menthol/Phenol/Sorbitol  1 each  02/13/18 15:20  





  Cepastat Lozenge -  MM   





  Q4H PRN   





  SORE THROAT   


 


Guaifenesin  10 ml  02/13/18 15:20  





  Robitussin Dm -  PO   





  Q6H PRN   





  COUGH   


 


Hydroxyzine Pamoate  50 mg  02/13/18 15:20  03/05/18 21:19





  Vistaril -  PO   50 mg





  Q4H PRN   Administration





  AGITATION   


 


Ibuprofen  400 mg  02/13/18 15:20  02/17/18 06:13





  Motrin -  PO   400 mg





  Q6H PRN   Administration





  Pain Level 4-6   


 


Loperamide HCl  4 mg  02/13/18 15:20  





  Imodium -  PO   





  Q6H PRN   





  DIARRHEA   


 


Magnesium Citrate  300 ml  02/13/18 15:20  





  Citroma -  PO   





  Q48H PRN   





  CONSTIPATION   


 


Magnesium Hydroxide  30 ml  02/13/18 15:20  03/04/18 09:40





  Milk Of Magnesia -  PO   30 ml





  DAILY PRN   Administration





  CONSTIPATION   


 


Methadone HCl 80 mg/ Methadone  100 mg  02/28/18 06:00  03/06/18 06:07





  HCl 20 mg  PO  03/07/18 05:59  100 mg





  DAILY@0600 KELLI   Administration


 


Naproxen  500 mg  02/15/18 13:00  03/05/18 21:18





  Naprosyn -  PO   500 mg





  BID KELLI   Administration


 


Nicotine  21 mg  02/14/18 10:00  03/05/18 10:22





  Nicoderm Patch -  TD   21 mg





  DAILY KELLI   Administration


 


Nicotine Polacrilex  4 mg  02/13/18 15:20  03/05/18 21:19





  Nicorette Gum -  BUC   4 mg





  Q2H PRN   Administration





  NICOTINE REPLACEMENT RX   


 


Pantoprazole Sodium  40 mg  02/15/18 12:45  03/05/18 10:22





  Protonix -  PO   40 mg





  DAILY KELLI   Administration


 


Prenatal Multivit/Folic Acid/Iron  1 tab  02/14/18 10:00  03/05/18 10:22





  Prenatal Vitamins (Sjr) -  PO   1 tab





  DAILY KELLI   Administration


 


Pseudoephedrine/Triprolidine  1 combo  02/13/18 15:20  





  Actifed -  PO   





  TID PRN   





  NASAL CONGESTION   


 


Senna  2 tab  02/20/18 22:00  03/05/18 21:19





  Senna -  PO   2 tab





  HS KELLI   Administration


 


Sertraline HCl  100 mg  02/14/18 10:00  03/05/18 10:22





  Zoloft -  PO   100 mg





  DAILY KELLI   Administration


 


Thiamine HCl  100 mg  02/13/18 22:00  03/05/18 21:18





  Vitamin B1 -  PO   100 mg





  HS KELLI   Administration














Mental Status Exam





- Mental Status Exam


Voice Loudness: Normal





Psychiatric Treatment Plan





- Problem List


(1) Sedative hypnotic or anxiolytic dependence


Current Visit: Yes   





(2) Opioid dependence on agonist therapy


Current Visit: No   





(3) Nicotine dependence


Current Visit: No   


Qualifiers: 


   Nicotine product type: cigarettes   Substance use status: uncomplicated   

Qualified Code(s): F17.210 - Nicotine dependence, cigarettes, uncomplicated   





(4) MDD (major depressive disorder)


Current Visit: Yes   Comment: Self reports. Is prescribed Zoloft 100mg PO 

daily.    





(5) Substance induced mood disorder


Current Visit: Yes   





(6) Substance-induced sleep disorder


Current Visit: Yes

## 2018-03-07 RX ADMIN — TOLNAFTATE SCH APPLIC: 10 CREAM TOPICAL at 14:19

## 2018-03-07 RX ADMIN — Medication SCH TAB: at 10:09

## 2018-03-07 RX ADMIN — NAPROXEN SCH MG: 500 TABLET ORAL at 10:09

## 2018-03-07 RX ADMIN — NICOTINE POLACRILEX PRN MG: 4 GUM, CHEWING ORAL at 10:11

## 2018-03-07 RX ADMIN — DOCUSATE SODIUM SCH MG: 100 CAPSULE, LIQUID FILLED ORAL at 06:05

## 2018-03-07 RX ADMIN — NICOTINE SCH MG: 21 PATCH TRANSDERMAL at 10:10

## 2018-03-07 RX ADMIN — NICOTINE POLACRILEX PRN MG: 4 GUM, CHEWING ORAL at 13:27

## 2018-03-07 RX ADMIN — NICOTINE POLACRILEX PRN MG: 4 GUM, CHEWING ORAL at 21:15

## 2018-03-07 RX ADMIN — SENNOSIDES SCH TAB: 8.6 TABLET, FILM COATED ORAL at 21:23

## 2018-03-07 RX ADMIN — CYCLOBENZAPRINE HYDROCHLORIDE SCH MG: 10 TABLET, FILM COATED ORAL at 06:05

## 2018-03-07 RX ADMIN — DOCUSATE SODIUM SCH MG: 100 CAPSULE, LIQUID FILLED ORAL at 13:25

## 2018-03-07 RX ADMIN — HYDROXYZINE PAMOATE PRN MG: 50 CAPSULE ORAL at 21:24

## 2018-03-07 RX ADMIN — DOCUSATE SODIUM SCH MG: 100 CAPSULE, LIQUID FILLED ORAL at 21:23

## 2018-03-07 RX ADMIN — TOLNAFTATE SCH APPLIC: 10 CREAM TOPICAL at 21:49

## 2018-03-07 RX ADMIN — Medication SCH MG: at 21:23

## 2018-03-07 RX ADMIN — NAPROXEN SCH MG: 500 TABLET ORAL at 21:24

## 2018-03-07 RX ADMIN — HYDROXYZINE PAMOATE PRN MG: 50 CAPSULE ORAL at 13:26

## 2018-03-07 RX ADMIN — CYCLOBENZAPRINE HYDROCHLORIDE SCH MG: 10 TABLET, FILM COATED ORAL at 21:24

## 2018-03-07 RX ADMIN — SERTRALINE HYDROCHLORIDE SCH MG: 50 TABLET ORAL at 10:09

## 2018-03-07 RX ADMIN — CYCLOBENZAPRINE HYDROCHLORIDE SCH MG: 10 TABLET, FILM COATED ORAL at 13:25

## 2018-03-07 RX ADMIN — METHADONE HYDROCHLORIDE SCH MG: 40 TABLET ORAL at 06:05

## 2018-03-07 RX ADMIN — PANTOPRAZOLE SODIUM SCH MG: 40 TABLET, DELAYED RELEASE ORAL at 10:10

## 2018-03-08 RX ADMIN — TOLNAFTATE SCH APPLIC: 10 CREAM TOPICAL at 10:05

## 2018-03-08 RX ADMIN — PANTOPRAZOLE SODIUM SCH MG: 40 TABLET, DELAYED RELEASE ORAL at 10:04

## 2018-03-08 RX ADMIN — TOLNAFTATE SCH APPLIC: 10 CREAM TOPICAL at 21:05

## 2018-03-08 RX ADMIN — METHADONE HYDROCHLORIDE SCH MG: 40 TABLET ORAL at 06:06

## 2018-03-08 RX ADMIN — CYCLOBENZAPRINE HYDROCHLORIDE SCH MG: 10 TABLET, FILM COATED ORAL at 06:05

## 2018-03-08 RX ADMIN — SENNOSIDES SCH TAB: 8.6 TABLET, FILM COATED ORAL at 21:03

## 2018-03-08 RX ADMIN — HYDROXYZINE PAMOATE PRN MG: 50 CAPSULE ORAL at 21:03

## 2018-03-08 RX ADMIN — Medication SCH MG: at 21:03

## 2018-03-08 RX ADMIN — DOCUSATE SODIUM SCH MG: 100 CAPSULE, LIQUID FILLED ORAL at 21:03

## 2018-03-08 RX ADMIN — DOCUSATE SODIUM SCH MG: 100 CAPSULE, LIQUID FILLED ORAL at 06:05

## 2018-03-08 RX ADMIN — CYCLOBENZAPRINE HYDROCHLORIDE SCH MG: 10 TABLET, FILM COATED ORAL at 14:36

## 2018-03-08 RX ADMIN — NICOTINE POLACRILEX PRN MG: 4 GUM, CHEWING ORAL at 21:05

## 2018-03-08 RX ADMIN — NICOTINE POLACRILEX PRN MG: 4 GUM, CHEWING ORAL at 14:37

## 2018-03-08 RX ADMIN — NAPROXEN SCH MG: 500 TABLET ORAL at 21:03

## 2018-03-08 RX ADMIN — CYCLOBENZAPRINE HYDROCHLORIDE SCH MG: 10 TABLET, FILM COATED ORAL at 21:03

## 2018-03-08 RX ADMIN — DOCUSATE SODIUM SCH MG: 100 CAPSULE, LIQUID FILLED ORAL at 14:35

## 2018-03-08 RX ADMIN — NICOTINE SCH MG: 21 PATCH TRANSDERMAL at 10:04

## 2018-03-08 RX ADMIN — SERTRALINE HYDROCHLORIDE SCH MG: 50 TABLET ORAL at 10:04

## 2018-03-08 RX ADMIN — Medication SCH TAB: at 10:04

## 2018-03-08 RX ADMIN — NAPROXEN SCH MG: 500 TABLET ORAL at 10:04

## 2018-03-09 RX ADMIN — DOCUSATE SODIUM SCH MG: 100 CAPSULE, LIQUID FILLED ORAL at 06:14

## 2018-03-09 RX ADMIN — NICOTINE POLACRILEX PRN MG: 4 GUM, CHEWING ORAL at 21:31

## 2018-03-09 RX ADMIN — TOLNAFTATE SCH APPLIC: 10 CREAM TOPICAL at 21:32

## 2018-03-09 RX ADMIN — NICOTINE SCH MG: 21 PATCH TRANSDERMAL at 10:01

## 2018-03-09 RX ADMIN — HYDROXYZINE PAMOATE PRN MG: 50 CAPSULE ORAL at 21:31

## 2018-03-09 RX ADMIN — NAPROXEN SCH MG: 500 TABLET ORAL at 21:31

## 2018-03-09 RX ADMIN — SENNOSIDES SCH TAB: 8.6 TABLET, FILM COATED ORAL at 21:31

## 2018-03-09 RX ADMIN — PANTOPRAZOLE SODIUM SCH MG: 40 TABLET, DELAYED RELEASE ORAL at 10:01

## 2018-03-09 RX ADMIN — DOCUSATE SODIUM SCH MG: 100 CAPSULE, LIQUID FILLED ORAL at 21:31

## 2018-03-09 RX ADMIN — NICOTINE POLACRILEX PRN MG: 4 GUM, CHEWING ORAL at 13:02

## 2018-03-09 RX ADMIN — HYDROXYZINE PAMOATE PRN MG: 50 CAPSULE ORAL at 13:02

## 2018-03-09 RX ADMIN — NAPROXEN SCH MG: 500 TABLET ORAL at 10:01

## 2018-03-09 RX ADMIN — Medication SCH TAB: at 10:01

## 2018-03-09 RX ADMIN — NICOTINE POLACRILEX PRN MG: 4 GUM, CHEWING ORAL at 10:03

## 2018-03-09 RX ADMIN — METHADONE HYDROCHLORIDE SCH MG: 40 TABLET ORAL at 06:14

## 2018-03-09 RX ADMIN — Medication SCH MG: at 21:30

## 2018-03-09 RX ADMIN — TOLNAFTATE SCH APPLIC: 10 CREAM TOPICAL at 10:01

## 2018-03-09 RX ADMIN — CYCLOBENZAPRINE HYDROCHLORIDE SCH MG: 10 TABLET, FILM COATED ORAL at 06:14

## 2018-03-09 RX ADMIN — CYCLOBENZAPRINE HYDROCHLORIDE SCH MG: 10 TABLET, FILM COATED ORAL at 13:02

## 2018-03-09 RX ADMIN — SERTRALINE HYDROCHLORIDE SCH MG: 50 TABLET ORAL at 10:01

## 2018-03-09 RX ADMIN — CYCLOBENZAPRINE HYDROCHLORIDE SCH MG: 10 TABLET, FILM COATED ORAL at 21:31

## 2018-03-09 RX ADMIN — DOCUSATE SODIUM SCH MG: 100 CAPSULE, LIQUID FILLED ORAL at 13:01

## 2018-03-10 RX ADMIN — NICOTINE POLACRILEX PRN MG: 4 GUM, CHEWING ORAL at 20:25

## 2018-03-10 RX ADMIN — Medication SCH MG: at 21:18

## 2018-03-10 RX ADMIN — NICOTINE POLACRILEX PRN MG: 4 GUM, CHEWING ORAL at 08:45

## 2018-03-10 RX ADMIN — DOCUSATE SODIUM SCH MG: 100 CAPSULE, LIQUID FILLED ORAL at 21:18

## 2018-03-10 RX ADMIN — SENNOSIDES SCH TAB: 8.6 TABLET, FILM COATED ORAL at 21:18

## 2018-03-10 RX ADMIN — NAPROXEN SCH MG: 500 TABLET ORAL at 21:18

## 2018-03-10 RX ADMIN — CYCLOBENZAPRINE HYDROCHLORIDE SCH MG: 10 TABLET, FILM COATED ORAL at 06:14

## 2018-03-10 RX ADMIN — TOLNAFTATE SCH APPLIC: 10 CREAM TOPICAL at 21:20

## 2018-03-10 RX ADMIN — PANTOPRAZOLE SODIUM SCH MG: 40 TABLET, DELAYED RELEASE ORAL at 09:40

## 2018-03-10 RX ADMIN — NICOTINE SCH MG: 21 PATCH TRANSDERMAL at 09:40

## 2018-03-10 RX ADMIN — CYCLOBENZAPRINE HYDROCHLORIDE SCH MG: 10 TABLET, FILM COATED ORAL at 14:02

## 2018-03-10 RX ADMIN — Medication SCH TAB: at 09:40

## 2018-03-10 RX ADMIN — SERTRALINE HYDROCHLORIDE SCH MG: 50 TABLET ORAL at 09:40

## 2018-03-10 RX ADMIN — NAPROXEN SCH MG: 500 TABLET ORAL at 09:40

## 2018-03-10 RX ADMIN — HYDROXYZINE PAMOATE PRN MG: 50 CAPSULE ORAL at 08:44

## 2018-03-10 RX ADMIN — MAGNESIUM HYDROXIDE PRN ML: 400 SUSPENSION ORAL at 18:30

## 2018-03-10 RX ADMIN — TOLNAFTATE SCH APPLIC: 10 CREAM TOPICAL at 09:40

## 2018-03-10 RX ADMIN — DOCUSATE SODIUM SCH MG: 100 CAPSULE, LIQUID FILLED ORAL at 06:14

## 2018-03-10 RX ADMIN — DOCUSATE SODIUM SCH MG: 100 CAPSULE, LIQUID FILLED ORAL at 14:02

## 2018-03-10 RX ADMIN — CYCLOBENZAPRINE HYDROCHLORIDE SCH MG: 10 TABLET, FILM COATED ORAL at 21:18

## 2018-03-10 RX ADMIN — METHADONE HYDROCHLORIDE SCH MG: 40 TABLET ORAL at 06:14

## 2018-03-11 RX ADMIN — TOLNAFTATE SCH APPLIC: 10 CREAM TOPICAL at 21:50

## 2018-03-11 RX ADMIN — CYCLOBENZAPRINE HYDROCHLORIDE SCH MG: 10 TABLET, FILM COATED ORAL at 06:23

## 2018-03-11 RX ADMIN — NICOTINE POLACRILEX PRN MG: 4 GUM, CHEWING ORAL at 12:34

## 2018-03-11 RX ADMIN — DOCUSATE SODIUM SCH MG: 100 CAPSULE, LIQUID FILLED ORAL at 14:07

## 2018-03-11 RX ADMIN — SENNOSIDES SCH TAB: 8.6 TABLET, FILM COATED ORAL at 21:18

## 2018-03-11 RX ADMIN — SERTRALINE HYDROCHLORIDE SCH MG: 50 TABLET ORAL at 09:41

## 2018-03-11 RX ADMIN — CYCLOBENZAPRINE HYDROCHLORIDE SCH MG: 10 TABLET, FILM COATED ORAL at 21:18

## 2018-03-11 RX ADMIN — MAGNESIUM HYDROXIDE PRN ML: 400 SUSPENSION ORAL at 21:18

## 2018-03-11 RX ADMIN — NAPROXEN SCH MG: 500 TABLET ORAL at 09:41

## 2018-03-11 RX ADMIN — Medication SCH TAB: at 09:41

## 2018-03-11 RX ADMIN — TOLNAFTATE SCH APPLIC: 10 CREAM TOPICAL at 09:41

## 2018-03-11 RX ADMIN — NICOTINE SCH MG: 21 PATCH TRANSDERMAL at 09:40

## 2018-03-11 RX ADMIN — DOCUSATE SODIUM SCH MG: 100 CAPSULE, LIQUID FILLED ORAL at 21:18

## 2018-03-11 RX ADMIN — NAPROXEN SCH MG: 500 TABLET ORAL at 21:18

## 2018-03-11 RX ADMIN — DOCUSATE SODIUM SCH MG: 100 CAPSULE, LIQUID FILLED ORAL at 06:23

## 2018-03-11 RX ADMIN — HYDROXYZINE PAMOATE PRN MG: 50 CAPSULE ORAL at 21:19

## 2018-03-11 RX ADMIN — Medication SCH MG: at 21:18

## 2018-03-11 RX ADMIN — CYCLOBENZAPRINE HYDROCHLORIDE SCH MG: 10 TABLET, FILM COATED ORAL at 14:07

## 2018-03-11 RX ADMIN — NICOTINE POLACRILEX PRN MG: 4 GUM, CHEWING ORAL at 09:42

## 2018-03-11 RX ADMIN — METHADONE HYDROCHLORIDE SCH MG: 40 TABLET ORAL at 06:22

## 2018-03-11 RX ADMIN — PANTOPRAZOLE SODIUM SCH MG: 40 TABLET, DELAYED RELEASE ORAL at 09:41

## 2018-03-11 RX ADMIN — HYDROXYZINE PAMOATE PRN MG: 50 CAPSULE ORAL at 12:34

## 2018-03-12 RX ADMIN — HYDROXYZINE PAMOATE PRN MG: 50 CAPSULE ORAL at 14:44

## 2018-03-12 RX ADMIN — Medication SCH MG: at 21:20

## 2018-03-12 RX ADMIN — NAPROXEN SCH MG: 500 TABLET ORAL at 21:21

## 2018-03-12 RX ADMIN — CYCLOBENZAPRINE HYDROCHLORIDE SCH MG: 10 TABLET, FILM COATED ORAL at 14:44

## 2018-03-12 RX ADMIN — NAPROXEN SCH MG: 500 TABLET ORAL at 09:49

## 2018-03-12 RX ADMIN — NICOTINE POLACRILEX PRN MG: 4 GUM, CHEWING ORAL at 14:45

## 2018-03-12 RX ADMIN — Medication SCH TAB: at 09:49

## 2018-03-12 RX ADMIN — SENNOSIDES SCH TAB: 8.6 TABLET, FILM COATED ORAL at 21:20

## 2018-03-12 RX ADMIN — SERTRALINE HYDROCHLORIDE SCH MG: 50 TABLET ORAL at 09:49

## 2018-03-12 RX ADMIN — MAGNESIUM HYDROXIDE PRN ML: 400 SUSPENSION ORAL at 10:25

## 2018-03-12 RX ADMIN — DOCUSATE SODIUM SCH MG: 100 CAPSULE, LIQUID FILLED ORAL at 21:20

## 2018-03-12 RX ADMIN — METHADONE HYDROCHLORIDE SCH MG: 40 TABLET ORAL at 06:08

## 2018-03-12 RX ADMIN — CYCLOBENZAPRINE HYDROCHLORIDE SCH MG: 10 TABLET, FILM COATED ORAL at 21:20

## 2018-03-12 RX ADMIN — PANTOPRAZOLE SODIUM SCH MG: 40 TABLET, DELAYED RELEASE ORAL at 09:49

## 2018-03-12 RX ADMIN — NICOTINE SCH MG: 21 PATCH TRANSDERMAL at 09:49

## 2018-03-12 RX ADMIN — NICOTINE POLACRILEX PRN MG: 4 GUM, CHEWING ORAL at 21:21

## 2018-03-12 RX ADMIN — TOLNAFTATE SCH APPLIC: 10 CREAM TOPICAL at 21:21

## 2018-03-12 RX ADMIN — NICOTINE POLACRILEX PRN MG: 4 GUM, CHEWING ORAL at 09:50

## 2018-03-12 RX ADMIN — DOCUSATE SODIUM SCH MG: 100 CAPSULE, LIQUID FILLED ORAL at 14:44

## 2018-03-12 RX ADMIN — TOLNAFTATE SCH APPLIC: 10 CREAM TOPICAL at 09:49

## 2018-03-12 RX ADMIN — DOCUSATE SODIUM SCH MG: 100 CAPSULE, LIQUID FILLED ORAL at 06:07

## 2018-03-12 RX ADMIN — CYCLOBENZAPRINE HYDROCHLORIDE SCH MG: 10 TABLET, FILM COATED ORAL at 06:07

## 2018-03-12 RX ADMIN — HYDROXYZINE PAMOATE PRN MG: 50 CAPSULE ORAL at 21:20

## 2018-03-12 NOTE — PN
Psychiatric Progress Note


Vital Signs: 


 Vital Signs











 Period  Temp  Pulse  Resp  BP Sys/Rahman  Pulse Ox


 


 Last 24 Hr  97.9 F  89  18-19  116/77  











Date of Session: 03/12/18


Chief Complaint:: Discharge Note


HPI: Patient addressing Sedative, Hypnotic or Anxiolytic Dependence comorbid 

with Opoid Dependence on Agonist Therapy, Nicotine Dependence, MDD, Substance-

Induced Mood Disorder and Substance-Induced Sleep Disorder


Current Medications: 


Active Medications











Generic Name Dose Route Start Last Admin





  Trade Name Freq  PRN Reason Stop Dose Admin


 


Acetaminophen  650 mg  02/13/18 15:20  03/08/18 14:35





  Tylenol -  PO   650 mg





  Q4H PRN   Administration





  FEVER   


 


Al Hydroxide/Mg Hydroxide  30 ml  02/13/18 15:20  





  Mylanta Oral Suspension -  PO   





  Q6H PRN   





  DYSPEPSIA   


 


Cyclobenzaprine HCl  10 mg  02/15/18 14:00  03/12/18 06:07





  Flexeril -  PO   10 mg





  TID KELLI   Administration


 


Docusate Sodium  100 mg  02/20/18 14:00  03/12/18 06:07





  Colace -  PO   100 mg





  TID KELLI   Administration


 


Eucalyptus/Menthol/Phenol/Sorbitol  1 each  02/13/18 15:20  





  Cepastat Lozenge -  MM   





  Q4H PRN   





  SORE THROAT   


 


Guaifenesin  10 ml  02/13/18 15:20  





  Robitussin Dm -  PO   





  Q6H PRN   





  COUGH   


 


Hydroxyzine Pamoate  50 mg  02/13/18 15:20  03/11/18 21:19





  Vistaril -  PO   50 mg





  Q4H PRN   Administration





  AGITATION   


 


Ibuprofen  400 mg  02/13/18 15:20  02/17/18 06:13





  Motrin -  PO   400 mg





  Q6H PRN   Administration





  Pain Level 4-6   


 


Loperamide HCl  4 mg  02/13/18 15:20  





  Imodium -  PO   





  Q6H PRN   





  DIARRHEA   


 


Magnesium Citrate  300 ml  02/13/18 15:20  





  Citroma -  PO   





  Q48H PRN   





  CONSTIPATION   


 


Magnesium Hydroxide  30 ml  02/13/18 15:20  03/11/18 21:18





  Milk Of Magnesia -  PO   30 ml





  DAILY PRN   Administration





  CONSTIPATION   


 


Methadone HCl 80 mg/ Methadone  100 mg  03/07/18 06:00  03/12/18 06:08





  HCl 20 mg  PO  03/14/18 05:59  100 mg





  DAILY@0600 KELLI   Administration


 


Naproxen  500 mg  02/15/18 13:00  03/11/18 21:18





  Naprosyn -  PO   500 mg





  BID KELLI   Administration


 


Nicotine  21 mg  02/14/18 10:00  03/11/18 09:40





  Nicoderm Patch -  TD   21 mg





  DAILY KELLI   Administration


 


Nicotine Polacrilex  4 mg  02/13/18 15:20  03/11/18 12:34





  Nicorette Gum -  BUC   4 mg





  Q2H PRN   Administration





  NICOTINE REPLACEMENT RX   


 


Pantoprazole Sodium  40 mg  02/15/18 12:45  03/11/18 09:41





  Protonix -  PO   40 mg





  DAILY KELLI   Administration


 


Prenatal Multivit/Folic Acid/Iron  1 tab  02/14/18 10:00  03/11/18 09:41





  Prenatal Vitamins (Sjr) -  PO   1 tab





  DAILY KELLI   Administration


 


Pseudoephedrine/Triprolidine  1 combo  02/13/18 15:20  03/08/18 06:08





  Actifed -  PO   1 combo





  TID PRN   Administration





  NASAL CONGESTION   


 


Senna  2 tab  02/20/18 22:00  03/11/18 21:18





  Senna -  PO   2 tab





  HS KELLI   Administration


 


Sertraline HCl  100 mg  02/14/18 10:00  03/11/18 09:41





  Zoloft -  PO   100 mg





  DAILY KELLI   Administration


 


Thiamine HCl  100 mg  02/13/18 22:00  03/11/18 21:18





  Vitamin B1 -  PO   100 mg





  HS KELLI   Administration


 


Tolnaftate  1 applic  03/07/18 13:40  03/11/18 21:50





  Tinactin 1% Cream -  TP   1 applic





  BID KELLI   Administration











Current Side Effect: No


Lab tests ordered: Yes


Lab tests reviewed: Yes


Provider note:: Patient will complete this program on 3/13/18. She has met her 

treatment goals and will continue to address her issues in attending AA/NA 

since she has refused treatment plan st up for her. She responded well to 

Zoloft 100 mg po daily and Belsomra 5 mg po HS prn for insomnia. Script for 30 

days supply of Zoloft is electronically transmitted to [facility]. She is 

stable for discharge on 3/13/18


Total face to face time:: 35





Mental Status Exam





- Mental Status Exam


Alert and Oriented to: Time, Place, Person


Cognitive Function: Fair


Patient Appearance: Well Groomed


Mood: Hopeful, Euthymic


Affect: Appropriate


Patient Behavior: Cooperative


Speech Pattern: Clear


Voice Loudness: Normal, Limited Variation


Thought Process: Goal Oriented


Thought Disorder: Not Present


Hallucinations: Denies


Suicidal Ideation: Denies


Insight/Judgement: Fair


Sleep: Fair


Appetite: Good


Muscle strength/Tone: Normal


Gait/Station: Normal





Psychiatric Treatment Plan





- Problem List


(1) Sedative hypnotic or anxiolytic dependence


Current Visit: Yes   





(2) Opioid dependence on agonist therapy


Current Visit: No   





(3) Nicotine dependence


Current Visit: No   


Qualifiers: 


   Nicotine product type: cigarettes   Substance use status: uncomplicated   

Qualified Code(s): F17.210 - Nicotine dependence, cigarettes, uncomplicated   





(4) MDD (major depressive disorder)


Current Visit: Yes   Comment: Self reports. Is prescribed Zoloft 100mg PO 

daily.    





(5) Substance induced mood disorder


Current Visit: Yes   





(6) Substance-induced sleep disorder


Current Visit: Yes   


Initial treatment plan: Patient will be discharged tomorrow and referred to a 

shelter as she is not accepting treatment plan set up for her

## 2018-03-13 ENCOUNTER — EMERGENCY (EMERGENCY)
Facility: HOSPITAL | Age: 38
LOS: 1 days | Discharge: HOME | End: 2018-03-13
Attending: EMERGENCY MEDICINE | Admitting: EMERGENCY MEDICINE
Payer: MEDICAID

## 2018-03-13 VITALS
DIASTOLIC BLOOD PRESSURE: 76 MMHG | HEART RATE: 104 BPM | OXYGEN SATURATION: 100 % | SYSTOLIC BLOOD PRESSURE: 120 MMHG | RESPIRATION RATE: 14 BRPM | TEMPERATURE: 98 F

## 2018-03-13 VITALS
OXYGEN SATURATION: 98 % | SYSTOLIC BLOOD PRESSURE: 124 MMHG | TEMPERATURE: 98 F | RESPIRATION RATE: 18 BRPM | DIASTOLIC BLOOD PRESSURE: 80 MMHG | HEART RATE: 75 BPM

## 2018-03-13 VITALS — SYSTOLIC BLOOD PRESSURE: 130 MMHG | TEMPERATURE: 98 F | HEART RATE: 84 BPM | DIASTOLIC BLOOD PRESSURE: 85 MMHG

## 2018-03-13 DIAGNOSIS — T50.901A POISONING BY UNSPECIFIED DRUGS, MEDICAMENTS AND BIOLOGICAL SUBSTANCES, ACCIDENTAL (UNINTENTIONAL), INITIAL ENCOUNTER: ICD-10-CM

## 2018-03-13 DIAGNOSIS — R41.82 ALTERED MENTAL STATUS, UNSPECIFIED: ICD-10-CM

## 2018-03-13 PROCEDURE — 93010 ELECTROCARDIOGRAM REPORT: CPT

## 2018-03-13 RX ADMIN — NAPROXEN SCH MG: 500 TABLET ORAL at 09:17

## 2018-03-13 RX ADMIN — METHADONE HYDROCHLORIDE SCH MG: 40 TABLET ORAL at 06:25

## 2018-03-13 RX ADMIN — SERTRALINE HYDROCHLORIDE SCH MG: 50 TABLET ORAL at 09:17

## 2018-03-13 RX ADMIN — Medication SCH TAB: at 09:17

## 2018-03-13 RX ADMIN — NICOTINE SCH: 21 PATCH TRANSDERMAL at 09:20

## 2018-03-13 RX ADMIN — TOLNAFTATE SCH APPLIC: 10 CREAM TOPICAL at 09:18

## 2018-03-13 RX ADMIN — DOCUSATE SODIUM SCH MG: 100 CAPSULE, LIQUID FILLED ORAL at 06:26

## 2018-03-13 RX ADMIN — PANTOPRAZOLE SODIUM SCH MG: 40 TABLET, DELAYED RELEASE ORAL at 09:17

## 2018-03-13 RX ADMIN — CYCLOBENZAPRINE HYDROCHLORIDE SCH MG: 10 TABLET, FILM COATED ORAL at 06:26

## 2018-03-13 NOTE — ED PROVIDER NOTE - MEDICAL DECISION MAKING DETAILS
Patient was BIB EMS for altered mental status. Will check EKG, place on cardiac monitor, and observe until more awake, alert, steady and with a safe plan for d/c.

## 2018-03-13 NOTE — ED CDU PROVIDER INITIAL DAY NOTE - DETAILS
Patient placed on obs for drug overdose. Unclear if this is a mix of long and short acting drugs or an adulterant. Sh was just released from  30 day program today. Patient placed on obs for drug overdose. Unclear if this is a mix of long and short acting drugs or an adulterant. She was just released from  30 day program today.

## 2018-03-13 NOTE — ED CDU PROVIDER INITIAL DAY NOTE - PROGRESS NOTE DETAILS
Patient got up (groggy) and made it to the bathroom. Her fiend woke up to. Then both fell asleep again. Awake, EMS who brought her in said she told them she did a bag of heroin. She says just benzos. Counselled on mixing drugs with Methadone maintenance. Will d/c.

## 2018-03-13 NOTE — ED PROVIDER NOTE - PROGRESS NOTE DETAILS
Patient still obtunded but rousable. Her fiend is as well. Will place on obs for unknown ingestion- possible long acting opioid / adulterant or other sedative. It also seems like she may be on methadone  (unclear).

## 2018-03-13 NOTE — ED CDU PROVIDER INITIAL DAY NOTE - MEDICAL DECISION MAKING DETAILS
Patient placed on obs for drug overdose. Unclear if this is a mix of long and short acting drugs or an adulterant. Sh was just released from  30 day program today.
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
## 2018-03-13 NOTE — ED PROVIDER NOTE - CARE PLAN
Principal Discharge DX:	Accidental drug overdose, initial encounter  Secondary Diagnosis:	Altered mental status, unspecified altered mental status type
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
## 2018-03-13 NOTE — ED CDU PROVIDER DISPOSITION NOTE - CLINICAL COURSE
Patient presenting with mixed recreational drug overdose- appears to have mixed heroin, benzos and methadone. Eventually woke up and was d/c'd.   \

## 2018-03-13 NOTE — ED ADULT NURSE NOTE - OBJECTIVE STATEMENT
Patient is a female presenting to the ED for AMS. Admits to heroin use. Given Narcan PTA. Patient maintaining airway, skin intact, and will continue to monitor.

## 2018-03-16 PROCEDURE — 99220: CPT | Mod: 25

## 2018-04-05 ENCOUNTER — EMERGENCY (EMERGENCY)
Facility: HOSPITAL | Age: 38
LOS: 1 days | Discharge: HOME | End: 2018-04-05
Admitting: EMERGENCY MEDICINE
Payer: MEDICAID

## 2018-04-05 VITALS
OXYGEN SATURATION: 98 % | SYSTOLIC BLOOD PRESSURE: 146 MMHG | HEART RATE: 54 BPM | RESPIRATION RATE: 16 BRPM | DIASTOLIC BLOOD PRESSURE: 89 MMHG

## 2018-04-05 VITALS
SYSTOLIC BLOOD PRESSURE: 126 MMHG | DIASTOLIC BLOOD PRESSURE: 56 MMHG | OXYGEN SATURATION: 95 % | RESPIRATION RATE: 14 BRPM | HEART RATE: 56 BPM | TEMPERATURE: 97 F

## 2018-04-05 DIAGNOSIS — Z88.0 ALLERGY STATUS TO PENICILLIN: ICD-10-CM

## 2018-04-05 DIAGNOSIS — R41.82 ALTERED MENTAL STATUS, UNSPECIFIED: ICD-10-CM

## 2018-04-05 DIAGNOSIS — F11.10 OPIOID ABUSE, UNCOMPLICATED: ICD-10-CM

## 2018-04-05 DIAGNOSIS — F19.10 OTHER PSYCHOACTIVE SUBSTANCE ABUSE, UNCOMPLICATED: ICD-10-CM

## 2018-04-05 PROCEDURE — 99282 EMERGENCY DEPT VISIT SF MDM: CPT

## 2018-04-05 NOTE — ED PROVIDER NOTE - OBJECTIVE STATEMENT
39 y/o female with unknown PMHx presents to ED after being found outside sleeping. Patient arrived in ED with friend who is also suffering from similar complaints. They both took Methadone and Chlonipine and both here previously for similar complaints. Does not have any medical complaints. HPI limited due to condition of patient. 37 y/o female with presents to ED after being found outside sleeping. Patient arrived in ED with partner with similar. They both took Methadone and Klonopin. Does not have any medical complaints. No signs of trauma

## 2018-04-05 NOTE — ED PROVIDER NOTE - MEDICAL DECISION MAKING DETAILS
AMS due to methadone/klonopin use, no signs of trauma, not hypoglycemic, will observe for clinical sobriety

## 2018-04-05 NOTE — ED PROVIDER NOTE - NEUROLOGICAL, MLM
Alert and oriented, no focal deficits, no motor or sensory deficits. Follows commands, opens eyes. somnolent, a&ox3, ataxia, moving all extremities x 4.

## 2018-04-05 NOTE — ED ADULT NURSE NOTE - OBJECTIVE STATEMENT
Pt BIBA with boyfriend, both admit to using benzo. Pt is alert to verbal stimuli, airway patent, no injuries noted.

## 2018-04-05 NOTE — ED PROVIDER NOTE - CONSTITUTIONAL, MLM
normal... Awake, alert, oriented to person, place, time/situation and in no apparent distress. No signs of trauma, follows commands and opens eyes. No signs of trauma, follows commands and opens eyes.

## 2018-04-05 NOTE — ED PROVIDER NOTE - UNABLE TO OBTAIN
HPI limited due to condition of patient. Urgent need for Intervention Unable to obtain ROS due to condition of patient.

## 2018-04-05 NOTE — ED ADULT NURSE NOTE - CHPI ED SYMPTOMS NEG
no nausea/no vomiting/no chills/no disorientation/no confusion/no abdominal distension/no abdominal pain/no fever/no weakness/no pain

## 2018-10-13 ENCOUNTER — EMERGENCY (EMERGENCY)
Facility: HOSPITAL | Age: 38
LOS: 1 days | Discharge: HOME | End: 2018-10-13
Admitting: EMERGENCY MEDICINE
Payer: MEDICAID

## 2018-10-13 VITALS
DIASTOLIC BLOOD PRESSURE: 99 MMHG | SYSTOLIC BLOOD PRESSURE: 151 MMHG | OXYGEN SATURATION: 98 % | HEART RATE: 78 BPM | RESPIRATION RATE: 14 BRPM

## 2018-10-13 VITALS
DIASTOLIC BLOOD PRESSURE: 78 MMHG | OXYGEN SATURATION: 95 % | TEMPERATURE: 99 F | HEART RATE: 69 BPM | SYSTOLIC BLOOD PRESSURE: 127 MMHG | RESPIRATION RATE: 16 BRPM

## 2018-10-13 PROBLEM — B19.20 UNSPECIFIED VIRAL HEPATITIS C WITHOUT HEPATIC COMA: Chronic | Status: ACTIVE | Noted: 2017-12-27

## 2018-10-13 PROBLEM — Z88.0 ALLERGY STATUS TO PENICILLIN: Chronic | Status: ACTIVE | Noted: 2017-12-27

## 2018-10-13 PROCEDURE — 99284 EMERGENCY DEPT VISIT MOD MDM: CPT

## 2018-10-13 RX ORDER — ONDANSETRON 8 MG/1
4 TABLET, FILM COATED ORAL ONCE
Qty: 0 | Refills: 0 | Status: COMPLETED | OUTPATIENT
Start: 2018-10-13 | End: 2018-10-13

## 2018-10-13 RX ORDER — NALOXONE HYDROCHLORIDE 4 MG/.1ML
2 SPRAY NASAL ONCE
Qty: 0 | Refills: 0 | Status: COMPLETED | OUTPATIENT
Start: 2018-10-13 | End: 2018-10-13

## 2018-10-13 RX ADMIN — NALOXONE HYDROCHLORIDE 2 MILLIGRAM(S): 4 SPRAY NASAL at 15:12

## 2018-10-13 RX ADMIN — ONDANSETRON 4 MILLIGRAM(S): 8 TABLET, FILM COATED ORAL at 15:56

## 2018-10-13 RX ADMIN — NALOXONE HYDROCHLORIDE 2 MILLIGRAM(S): 4 SPRAY NASAL at 15:56

## 2018-10-13 RX ADMIN — ONDANSETRON 4 MILLIGRAM(S): 8 TABLET, FILM COATED ORAL at 17:24

## 2018-10-13 RX ADMIN — NALOXONE HYDROCHLORIDE 2 MILLIGRAM(S): 4 SPRAY NASAL at 14:29

## 2018-10-13 NOTE — ED PROVIDER NOTE - MEDICAL DECISION MAKING DETAILS
pt here for public intox, monitored in the ED with improved mental status, AFVSS, currently ambulatory with steady gait, tolerating PO without N/V, clear speech, without additional medical complaints noted.  Repeat exam and neuro/cranial nerve exams wnl.  No evidence of head/neck trauma. Pt feels much better and safe for discharge.  No evidence of intoxication at this time or alcohol withdrawal. Medically stable for d/c. pt here for public intox, admits to using methadone and heroin s/p narcan, monitored in the ED with improved mental status, AFVSS, currently ambulatory with steady gait, tolerating PO without N/V, clear speech, without additional medical complaints noted.  Repeat exam and neuro/cranial nerve exams wnl.  No evidence of head/neck trauma. Pt feels much better and safe for discharge.  No evidence of intoxication at this time or withdrawal. Medically stable for d/c.

## 2018-10-13 NOTE — ED PROVIDER NOTE - OBJECTIVE STATEMENT
37 yo F with PMHx of 37 yo F with PMHx of Hep C, s/p IVDU on methadone, BIBA for public intoxication and AMS.  Pt was found minimally responsive on the streets with difficulty arousing.  Pt reports taking 110mg of methadone today, unable to verbalize her normal dose of methadone and limited HPI/ROS due to current clinical states.  No apparent trauma, bleeding, respiratory distress, N/V, pain, focal weakness, urinary/bowel incontinence or tremors noted. Pt is altered and unable to provide clinical information currently

## 2018-10-13 NOTE — ED ADULT TRIAGE NOTE - CHIEF COMPLAINT QUOTE
patient BIBA after being found on [address] and [address], patient admits to taking normal dosage of methadone. patient awake to verbal stimuli.

## 2018-10-13 NOTE — ED ADULT NURSE NOTE - INTERVENTIONS DEFINITIONS
Monitor for mental status changes and reorient to person, place, and time/Instruct patient to call for assistance

## 2018-10-13 NOTE — ED PROVIDER NOTE - PROGRESS NOTE DETAILS
noted bradypneic but maintaining patent airway, slightly improved resp status s/p nebulized narcan, end tidal hypercarbic to 50s, will continue w narcan neb, low threshold for IV narcan if no improvement

## 2018-10-13 NOTE — ED PROVIDER NOTE - PHYSICAL EXAMINATION
Gen - Disheveled F, lethargic but arousable by verbal stimuli  Skin - warm, dry, intact  HEENT - AT/NC, PERRL, mild conjunctival injection, pupils 3mm b/l, TM intact with no hemotympanium b/l, no facial contusion or periorbital ecchymosis, o/p clear, uvula midline, airway patent, neck supple with no step off or midline tenderness, FROM   CV - S1S2, R/R/R  Resp - respiration non-labored, CTAB, symmetric bs b/l, no r/r/w  GI - NABS, soft, ND, NT, no rebound or guarding, no CVAT b/l  MS - moving all extremities, no c/c/e   Neuro - Lethargic but arousable by verbal stimuli, slurred speech and unsteady gait Gen - Disheveled F, lethargic but arousable by verbal stimuli  Skin - warm, dry, intact  HEENT - AT/NC, PERRL, mild conjunctival injection, pupils 2mm b/l, TM intact with no hemotympanium b/l, no facial contusion or periorbital ecchymosis, o/p clear, uvula midline, airway patent, neck supple with no step off or midline tenderness, FROM   CV - S1S2, R/R/R  Resp - respiration non-labored, CTAB, symmetric bs b/l, no r/r/w  GI - NABS, soft, ND, NT, no rebound or guarding, no CVAT b/l  MS - moving all extremities, no c/c/e   Neuro - Lethargic but arousable by verbal stimuli, slurred speech and unsteady gait

## 2018-10-17 DIAGNOSIS — R41.82 ALTERED MENTAL STATUS, UNSPECIFIED: ICD-10-CM

## 2018-10-17 DIAGNOSIS — Z79.2 LONG TERM (CURRENT) USE OF ANTIBIOTICS: ICD-10-CM

## 2018-10-17 DIAGNOSIS — R26.81 UNSTEADINESS ON FEET: ICD-10-CM

## 2018-10-17 DIAGNOSIS — R47.81 SLURRED SPEECH: ICD-10-CM

## 2018-10-17 DIAGNOSIS — R53.83 OTHER FATIGUE: ICD-10-CM

## 2018-10-17 DIAGNOSIS — F17.200 NICOTINE DEPENDENCE, UNSPECIFIED, UNCOMPLICATED: ICD-10-CM

## 2018-10-17 DIAGNOSIS — T40.3X1A POISONING BY METHADONE, ACCIDENTAL (UNINTENTIONAL), INITIAL ENCOUNTER: ICD-10-CM

## 2019-01-04 ENCOUNTER — EMERGENCY (EMERGENCY)
Facility: HOSPITAL | Age: 39
LOS: 1 days | Discharge: HOME | End: 2019-01-04
Attending: EMERGENCY MEDICINE | Admitting: EMERGENCY MEDICINE
Payer: MEDICAID

## 2019-01-04 VITALS
RESPIRATION RATE: 10 BRPM | TEMPERATURE: 97 F | HEART RATE: 52 BPM | DIASTOLIC BLOOD PRESSURE: 63 MMHG | OXYGEN SATURATION: 99 % | SYSTOLIC BLOOD PRESSURE: 92 MMHG

## 2019-01-04 PROCEDURE — 93010 ELECTROCARDIOGRAM REPORT: CPT

## 2019-01-04 PROCEDURE — 99284 EMERGENCY DEPT VISIT MOD MDM: CPT | Mod: 25

## 2019-01-04 NOTE — ED PROVIDER NOTE - NS ED ROS FT
No nausea/vomiting   No signs of acute trauma or injury.  Not answering many questions, follows commands

## 2019-01-04 NOTE — ED PROVIDER NOTE - OBJECTIVE STATEMENT
Patient was BIBE for public drug intoxication. Unsteady gait, drowsy. No pain, no n/v and no trauma or incontinence. Says she takes 120mg methadone daily. Denies other drugs/etoh. Very somnolent. Rousable to loud voice and sternal rub.

## 2019-01-04 NOTE — ED ADULT NURSE NOTE - NSIMPLEMENTINTERV_GEN_ALL_ED
Implemented All Fall Risk Interventions:  [place] to call system. Call bell, personal items and telephone within reach. Instruct patient to call for assistance. Room bathroom lighting operational. Non-slip footwear when patient is off stretcher. Physically safe environment: no spills, clutter or unnecessary equipment. Stretcher in lowest position, wheels locked, appropriate side rails in place. Provide visual cue, wrist band, yellow gown, etc. Monitor gait and stability. Monitor for mental status changes and reorient to person, place, and time. Review medications for side effects contributing to fall risk. Reinforce activity limits and safety measures with patient and family.

## 2019-01-04 NOTE — ED ADULT TRIAGE NOTE - CHIEF COMPLAINT QUOTE
Patient found laying in front of [place], admits to taking 125mg of methadone during triage patent lethargic

## 2019-01-04 NOTE — ED PROVIDER NOTE - NSFOLLOWUPINSTRUCTIONS_ED_ALL_ED_FT
Please get help for drug abuse.   Return to the ER for Emergencies.   [facility] is a good referral line for crisis and substance abuse help.   Follow up with your OB/Gyne as planned.

## 2019-01-04 NOTE — ED PROVIDER NOTE - PROGRESS NOTE DETAILS
Walking around ED, still unsteady. Says she is 12 weeks pregnant. Has had sonos. Admitted to drug abuse- too a friends Lyrica 600mg, a Catapres, Xanax x 4 and her daily methadone. Urinated on self. Given dry paper scrubs. Patient is awake, alert and steady for d/c.

## 2019-01-04 NOTE — ED PROVIDER NOTE - PHYSICAL EXAMINATION
Const: Severe intox/AMS, good hygiene.  ENT: Airway patent, protecting airway. Nasal mucosa clear. MMM. No scalp hematoma and no apparent c-spine tenderness.  Eyes: Clear bilaterally, pupils equal, round 3mm  Cardiac: Normal rate, regular rhythm.  Heart sounds S1, S2.  No murmurs, rubs or gallops.  Resp: Breath sounds clear and equal bilaterally.  GI: Abdomen soft, appears non-tender, no guarding.  MSK: No signs of acute trauma or injury.   Neuro: Severe intox/AMS, BOLES, normal tone, slurred speech, moan answers to simple questions, follows commands.  Skin: No signs of acute trauma or injury.   Psych: Severe intox/AMS, mostly cooperative

## 2019-01-04 NOTE — ED PROVIDER NOTE - MEDICAL DECISION MAKING DETAILS
Patient here with apparent isolated drug intoxication. Pupil seem a little big for pure methadone. Will observe until more awake, alert, steady and with a safe plan for d/c. EKG sinus royal, placed on O2 Sat monitor.

## 2019-01-04 NOTE — ED ADULT NURSE NOTE - CHIEF COMPLAINT QUOTE
Patient found laying in front of [place], admits to taking 125mg of methadone during triage patent lethargic

## 2019-01-05 VITALS
OXYGEN SATURATION: 96 % | RESPIRATION RATE: 16 BRPM | DIASTOLIC BLOOD PRESSURE: 72 MMHG | HEART RATE: 56 BPM | SYSTOLIC BLOOD PRESSURE: 114 MMHG

## 2019-01-05 NOTE — ED ADULT NURSE REASSESSMENT NOTE - NS ED NURSE REASSESS COMMENT FT1
patient awake and walking around; states she took her methadone 130mg, Lyrica 600mg from a friend, a catapress "to get fucked up" and 4mg xanax prior to arrival in ED; awake now and walking around to bathroom; minor unsteady gait but states she does not need help; given water, food and coffee as per patient request; paper scrubs given

## 2019-01-08 DIAGNOSIS — R41.82 ALTERED MENTAL STATUS, UNSPECIFIED: ICD-10-CM

## 2019-01-08 DIAGNOSIS — Z88.0 ALLERGY STATUS TO PENICILLIN: ICD-10-CM

## 2019-01-08 DIAGNOSIS — T40.3X1A POISONING BY METHADONE, ACCIDENTAL (UNINTENTIONAL), INITIAL ENCOUNTER: ICD-10-CM

## 2019-05-08 ENCOUNTER — EMERGENCY (EMERGENCY)
Facility: HOSPITAL | Age: 39
LOS: 1 days | Discharge: HOME | End: 2019-05-08
Admitting: EMERGENCY MEDICINE
Payer: MEDICAID

## 2019-05-08 VITALS
OXYGEN SATURATION: 96 % | RESPIRATION RATE: 18 BRPM | HEART RATE: 60 BPM | SYSTOLIC BLOOD PRESSURE: 109 MMHG | TEMPERATURE: 98 F | DIASTOLIC BLOOD PRESSURE: 61 MMHG

## 2019-05-08 VITALS
DIASTOLIC BLOOD PRESSURE: 78 MMHG | SYSTOLIC BLOOD PRESSURE: 119 MMHG | OXYGEN SATURATION: 96 % | RESPIRATION RATE: 16 BRPM | TEMPERATURE: 98 F | HEART RATE: 65 BPM

## 2019-05-08 PROBLEM — F11.20 OPIOID DEPENDENCE, UNCOMPLICATED: Chronic | Status: ACTIVE | Noted: 2019-01-04

## 2019-05-08 PROCEDURE — 99283 EMERGENCY DEPT VISIT LOW MDM: CPT

## 2019-05-08 NOTE — ED PROVIDER NOTE - PHYSICAL EXAMINATION
General: no signs of trauma, somnolent, in no apparent distress.  Head: AT, NC  HEENT: Airway patent  Eyes: clear bilaterally, pupils equal, round and reactive to light.  Cardiac: Normal rate, regular rhythm.  Heart sounds S1, S2.  No murmurs, rubs or gallops.  Respiratory: Breath sounds clear and equal bilaterally.  Abdomen soft, non-tender, no guarding.  MSK: moving all extremities x 4  Neuro: somnolent, follows commands, ataxia, slurred speech  Skin: normal color.

## 2019-05-08 NOTE — ED ADULT TRIAGE NOTE - CHIEF COMPLAINT QUOTE
pt found sleeping on 14th and 3rd, arousable to verbal painful stimuli. admits to methadone use.  in field

## 2019-05-08 NOTE — ED PROVIDER NOTE - OBJECTIVE STATEMENT
40 y/o F with unknown PMHx BIB EMS after being found on the street presents to ED with AMS. Pt is reported to have used methadone. She has no trauma or complaints. Hx is limited due to Pt status. Chart review shows previous ER visits for similar circumstances. 38 y/o F with unknown PMHx BIBEMS after being found on the street presents to ED with AMS. Pt is reported to have used methadone. She has no trauma or complaints. Hx is limited due to Pt status. Chart review shows previous ER visits for similar

## 2019-05-12 DIAGNOSIS — R41.82 ALTERED MENTAL STATUS, UNSPECIFIED: ICD-10-CM

## 2020-09-12 ENCOUNTER — EMERGENCY (EMERGENCY)
Facility: HOSPITAL | Age: 40
LOS: 1 days | Discharge: HOME | End: 2020-09-12
Attending: EMERGENCY MEDICINE | Admitting: EMERGENCY MEDICINE
Payer: COMMERCIAL

## 2020-09-12 VITALS
OXYGEN SATURATION: 96 % | RESPIRATION RATE: 15 BRPM | SYSTOLIC BLOOD PRESSURE: 102 MMHG | HEART RATE: 56 BPM | DIASTOLIC BLOOD PRESSURE: 69 MMHG | TEMPERATURE: 98 F

## 2020-09-12 VITALS
OXYGEN SATURATION: 98 % | WEIGHT: 149.91 LBS | HEIGHT: 66 IN | SYSTOLIC BLOOD PRESSURE: 121 MMHG | DIASTOLIC BLOOD PRESSURE: 81 MMHG | TEMPERATURE: 98 F | HEART RATE: 60 BPM | RESPIRATION RATE: 15 BRPM

## 2020-09-12 PROCEDURE — 93005 ELECTROCARDIOGRAM TRACING: CPT

## 2020-09-12 PROCEDURE — 99284 EMERGENCY DEPT VISIT MOD MDM: CPT | Mod: 25

## 2020-09-12 PROCEDURE — 93010 ELECTROCARDIOGRAM REPORT: CPT

## 2020-09-12 PROCEDURE — 99284 EMERGENCY DEPT VISIT MOD MDM: CPT

## 2020-09-12 PROCEDURE — 96372 THER/PROPH/DIAG INJ SC/IM: CPT

## 2020-09-12 PROCEDURE — 82962 GLUCOSE BLOOD TEST: CPT

## 2020-09-12 RX ORDER — NALOXONE HYDROCHLORIDE 4 MG/.1ML
2 SPRAY NASAL ONCE
Refills: 0 | Status: DISCONTINUED | OUTPATIENT
Start: 2020-09-12 | End: 2020-09-12

## 2020-09-12 RX ORDER — NALOXONE HYDROCHLORIDE 4 MG/.1ML
0.4 SPRAY NASAL ONCE
Refills: 0 | Status: COMPLETED | OUTPATIENT
Start: 2020-09-12 | End: 2020-09-12

## 2020-09-12 RX ADMIN — NALOXONE HYDROCHLORIDE 0.4 MILLIGRAM(S): 4 SPRAY NASAL at 14:45

## 2020-09-12 NOTE — ED PROVIDER NOTE - PHYSICAL EXAMINATION
VITAL SIGNS: I have reviewed nursing notes and confirm.  CONSTITUTIONAL: Somnolent but arousable to painful stimuli.   SKIN:  warm and dry, no acute rash.   HEAD:  normocephalic, atraumatic.  EYES: EOM intact; PEERLA 4mm. Conjunctiva and sclera clear.  ENT: No nasal discharge; airway clear.   NECK: Supple; non tender.  CARD: S1, S2 normal; no murmurs, gallops, or rubs. Regular rate and rhythm.   RESP:  Clear to auscultation b/l, no wheezes, rales or rhonchi.  ABD: Normal bowel sounds; soft; non-distended; non-tender; no guarding/ rebound.  MSK: No obvious or apparent trauma.   NEURO: Alert, oriented, grossly unremarkable  PSYCH: Cooperative, mood and affect appropriate. VITAL SIGNS: I have reviewed nursing notes and confirm.  CONSTITUTIONAL: Somnolent but arousable to painful stimuli and then goes back to sleep, not answering questions.   SKIN:  warm and dry, no acute rash.   HEAD:  normocephalic, atraumatic.  EYES: EOM intact; PEERLA 4mm. Conjunctiva and sclera clear.  ENT: No nasal discharge; airway clear.   NECK: Supple; non tender.  CARD: S1, S2 normal; no murmurs, gallops, or rubs. Regular rate and rhythm.   RESP:  Clear to auscultation b/l, no wheezes, rales or rhonchi.  ABD: Normal bowel sounds; soft; non-distended; non-tender; no guarding/ rebound.  MSK: No obvious or apparent trauma.   NEURO: responds to pain, guzman, unable to assess sensory  PSYCH: Cooperative, mood and affect appropriate.

## 2020-09-12 NOTE — ED PROVIDER NOTE - OBJECTIVE STATEMENT
39 y/o F PMHx substance abuse on methadone, hepatitis C, prior visits for substance abuse presents to the ED BIBA after she was found sleeping in the bushes. Pt reports to EMS she used PCP. Unable to give more Hx due to AMS. 39 y/o F PMHx substance abuse on methadone, hepatitis C, prior visits for substance abuse based on chart review presents to the ED BIBA after she was found sleeping in the bushes. Pt reports to EMS she used PCP. Unable to give me more Hx due to AMS.

## 2020-09-12 NOTE — ED PROVIDER NOTE - NSFOLLOWUPINSTRUCTIONS_ED_ALL_ED_FT
Substance abuse    Don't use drugs; go to detox.    Substance Abuse    Chemical dependency is an addiction to drugs or alcohol. It is characterized by the repeated behavior of seeking out and using drugs and alcohol despite harmful consequences to the health and safety of oneself and others. Using drugs in a manner that brought you to an Emergency Room suggests you may have an drug abuse problem. Seek help at a drug addiction center.    SEEK IMMEDIATE MEDICAL CARE IF YOU HAVE ANY OF THE FOLLOWING SYMPTOMS: chest pain, shortness of breath, change in mental status, thoughts about hurting killing yourself, thoughts about hurting or killing somebody else, hallucinations, or worsening depression. Alcohol/drugs are harmful to your health.  Stay well hydrated.  Return for fevers, persistent vomit, uncontrolled pain, worsening breathing, worsening lightheaded, worsening shaking, worsening confusion.  Follow up with primary doctor within 1-2 days.     Substance Use Disorder    Substance use disorder occurs when a person's repeated use of drugs or alcohol interferes with his or her ability to be productive. This disorder can cause problems with mental and physical health. It can affect your ability to have healthy relationships, and it can keep you from being able to meet your responsibilities at work, home, or school. It can also lead to addiction, which is a condition in which the person cannot stop using the substance consistently for a period of time.    The most commonly abused substances include:  Alcohol. Tobacco. Marijuana. Stimulants, such as cocaine and methamphetamine. Hallucinogens, such as LSD and PCP. Opioids, such as some prescription pain medicines and heroin.    What are the causes?  This condition may develop due to many complex social, psychological, or physical reasons, such as:  Stress. Abuse. Peer pressure. Anxiety or depression.     What increases the risk?  This condition is more likely to develop in people who:  Use substances to cope with stress. Have been abused. Have a mental health disorder, such as depression. Have a family history of substance use disorder.    What are the signs or symptoms?  Symptoms of this condition include:  Using the substance for longer periods of time or at a higher dosage than what is normal or intended. Having a lasting desire to use the substance. Being unable to slow down or stop your use of the substance. Spending an abnormal amount of time getting the substance, using the substance, or recovering from using the substance. Craving the substance. Using the substance in a way that interferes with work, school, social activities, and personal relationships. Using the substance even after having negative consequences, such as:  Health problems. Legal or financial troubles. Job loss. Broken relationships. Needing more and more of the substance to get the same effect (developing tolerance). Experiencing unpleasant symptoms if you do not use the substance (withdrawal). Using the substance to avoid withdrawal.    How is this diagnosed?  This condition may be diagnosed based on:  A physical exam. Your history of substance use. Your symptoms. This includes:  How substance use affects your life. Changes in personality, behaviors, and mood. Having at least two symptoms of substance use disorder within a 12-month period. Health issues related to substance use, such as liver damage, shortness of breath, fatigue, cough, or heart problems. Blood or urine tests to screen for alcohol and drugs.    How is this treated?  This condition may be treated by:  Stopping substance use safely. This may require taking medicines and being closely observed for several days. Taking part in group and individual counseling from mental health providers who help people with substance use disorder. Staying at a live-in (residential) treatment center for several days or weeks. Attending daily counseling sessions at a treatment center. Taking medicine as told by your health care provider:  To ease symptoms and prevent complications during withdrawal. To treat other mental health issues, such as depression or anxiety. To block cravings by causing the same effects as the substance. To block the effects of the substance or replace good sensations with unpleasant ones. Going to a support group to share your experience with others who are going through the same thing. Recovery can be a long process. Many people who undergo treatment start using the substance again after stopping (relapse). If you relapse, that does not mean that treatment will not work.    Follow these instructions at home:   Take over-the-counter and prescription medicines only as told by your health care provider. Do not use any drugs or alcohol. Attend support groups as needed. These groups, including 12-step programs like Alcoholics Anonymous and Narcotics Anonymous, are an important part of long-term recovery for many people. Keep all follow-up visits as told by your health care providers. This is important. This includes continuing to work with therapists and support groups.    Contact a health care provider if:  You cannot take your medicines as told. Your symptoms get worse. You have trouble resisting the urge to use drugs or alcohol. Get help right away if you:  Relapse. Think that you may have taken too much of a drug. The hotline of the National Poison Control Center is (186) 451-2567.Have signs of an overdose. Symptoms include:  Chest pain. Confusion. Sleepiness or difficulty staying awake. Slowed breathing. Nausea or vomiting. A seizure. Have serious thoughts about hurting yourself or someone else. Drug overdose is an emergency. Do not wait to see if the symptoms will go away. Get medical help right away. Call your local emergency services (311 in the [place] Do not drive yourself to the hospital.   If you ever feel like you may hurt yourself or others, or have thoughts about taking your own life, get help right away. You can go to your nearest emergency department or call:   Your local emergency services (911 in the [place] A suicide crisis helpline, such as the National Suicide Prevention Lifeline at 1-600.539.7299. This is open 24 hours a day.     Substance abuse    Don't use drugs; go to detox.    Substance Abuse    Chemical dependency is an addiction to drugs or alcohol. It is characterized by the repeated behavior of seeking out and using drugs and alcohol despite harmful consequences to the health and safety of oneself and others. Using drugs in a manner that brought you to an Emergency Room suggests you may have an drug abuse problem. Seek help at a drug addiction center.    SEEK IMMEDIATE MEDICAL CARE IF YOU HAVE ANY OF THE FOLLOWING SYMPTOMS: chest pain, shortness of breath, change in mental status, thoughts about hurting killing yourself, thoughts about hurting or killing somebody else, hallucinations, or worsening depression.

## 2020-09-12 NOTE — ED PROVIDER NOTE - PATIENT PORTAL LINK FT
You can access the FollowMyHealth Patient Portal offered by [facility] by registering at the following website: [email or web address]. By joining [employer]’s FollowMyHealth portal, you will also be able to view your health information using other applications (apps) compatible with our system.

## 2020-09-12 NOTE — ED PROVIDER NOTE - CLINICAL SUMMARY MEDICAL DECISION MAKING FREE TEXT BOX
41 y/o F PMHx substance abuse BIBA after reportedly using PCP. Pt now w/ AMS. Vital signs stable. Finger stick normal. No evidence of trauma on exam. Suspected AMS secondary to substance abuse. Will try narcan and monitor for sobering. Will reassess after pt able to communicate. Will also CT head.

## 2020-09-12 NOTE — ED PROVIDER NOTE - PROGRESS NOTE DETAILS
Pt w slow rr and mild bradycardia on monitor while sleeping deeply - improved w stimulation.  Narcan given w/ mild increased alertness but still uncommunicative.  EKG w nsr 72.  Will cont to monitor for sobering.  Suspect polysubstance use based on exam and prior records - likely methadone + poss benzos or other substances. Pt brought to head CT after Narcan. Pt awake enough to refuse CT head. On eval, pt denies trauma or other complaints. Pt then became somnolent when questioned about his drug use and did not answer any questions. CT head cancelled. Will continue to monitor for sobering. Pt brought to head CT after Narcan. Pt now awake enough to refuse CT head. On eval, pt denies trauma or other complaints. Pt then became somnolent when questioned about her substance use today and did not answer any questions. CT head cancelled - no sig concern for intracranial pathology based on prior visits, hpi today, and no trauma on exam as well as improving ms carla narcan and time. Will continue to monitor for sobering. Pt more easily wakes to touch/voice but remains somnolent. Pt pending sobering; pt signed out to Dr Pena.  Plan dc when awake, ambulatory w steady gait if no other issues after reassessment. Pt reassessed. She is still sleepy but arousable, no focal neuro deficits. She asks "what time is it?" Then goes back to sleep. She admits to taking Clonopin. Denies SI. plan for continued monitoring until clinically sober, ambulatory and stable for DC> Michael: received s/o pending sobriety. Pt admits to OD on klonopin, states accidental and denies SI/HI. Pt awake and alert. Asymptomatic. Denies SI/HI. Steady unassisted gait. Tolerating PO. Clinically sober and not withdrawing, given copy of results, comfortable for dc, outpt PMD f/u.   given list of shelters.

## 2020-09-12 NOTE — ED ADULT NURSE NOTE - PRIMARY CARE PROVIDER
"Oncology Rooming Note    February 18, 2019 2:39 PM   Ashleigh Alonzo is a 60 year old female who presents for:    Chief Complaint   Patient presents with     Oncology Clinic Visit     Return: Breast Ca     Initial Vitals: /65   Pulse 80   Temp 98.1  F (36.7  C) (Oral)   Resp 16   Ht 1.6 m (5' 3\")   Wt 68.1 kg (150 lb 2 oz)   SpO2 99%   Breastfeeding? No   BMI 26.59 kg/m   Estimated body mass index is 26.59 kg/m  as calculated from the following:    Height as of this encounter: 1.6 m (5' 3\").    Weight as of this encounter: 68.1 kg (150 lb 2 oz). Body surface area is 1.74 meters squared.  No Pain (0) Comment: Data Unavailable   No LMP recorded. Patient is postmenopausal.  Allergies reviewed: Yes  Medications reviewed: Yes    Medications: Medication refills not needed today.  Pharmacy name entered into [employer]:    [facility] [place], [place] - [address] [facility] - [place], [place] - [address] [address] [facility] [address]    Clinical concerns: no new concerns Dr. Bradshaw was notified.    10 minutes for nursing intake (face to face time)     Britney Reid CMA              "
unknown

## 2020-09-12 NOTE — ED ADULT NURSE REASSESSMENT NOTE - NS ED NURSE REASSESS COMMENT FT1
Pt drowsy, RR 8-10. Placed on 2L NC. Pt continues to be very difficult to arouse. DM Director made aware. Narcan IM ordered and given. EKG done. Plan for CTH due to AMS, pt unable to provide history. Pt more easily arouseable after narcan, continues to be drowsy.

## 2020-09-12 NOTE — ED ADULT NURSE NOTE - OBJECTIVE STATEMENT
Pt presents to ED with EMS. Pt found sleeping in bush. Pt extremely drowsy. +response to sternal Rub. VSS. RR12-13. Pt With 1-2mm pupils b/l. Pt unable to follow commands and answers orientation questions, pt falls directly back to sleep without stimulation. Pt placed on Pox, 94-97%. Will continue to monitor.

## 2020-09-12 NOTE — ED ADULT TRIAGE NOTE - CHIEF COMPLAINT QUOTE
as per EMS someone called 911 because patient was sleeping in the bushes and patient states "I did PCP".

## 2020-09-15 DIAGNOSIS — R41.82 ALTERED MENTAL STATUS, UNSPECIFIED: ICD-10-CM

## 2020-09-15 DIAGNOSIS — F11.10 OPIOID ABUSE, UNCOMPLICATED: ICD-10-CM

## 2020-09-15 DIAGNOSIS — Z88.0 ALLERGY STATUS TO PENICILLIN: ICD-10-CM

## 2020-10-31 ENCOUNTER — EMERGENCY (EMERGENCY)
Facility: HOSPITAL | Age: 40
LOS: 1 days | Discharge: HOME | End: 2020-10-31
Admitting: EMERGENCY MEDICINE
Payer: MEDICAID

## 2020-10-31 VITALS
HEART RATE: 98 BPM | SYSTOLIC BLOOD PRESSURE: 114 MMHG | WEIGHT: 154.98 LBS | OXYGEN SATURATION: 95 % | DIASTOLIC BLOOD PRESSURE: 77 MMHG | HEIGHT: 66 IN | RESPIRATION RATE: 17 BRPM | TEMPERATURE: 99 F

## 2020-10-31 DIAGNOSIS — R41.82 ALTERED MENTAL STATUS, UNSPECIFIED: ICD-10-CM

## 2020-10-31 DIAGNOSIS — F11.10 OPIOID ABUSE, UNCOMPLICATED: ICD-10-CM

## 2020-10-31 DIAGNOSIS — Z88.0 ALLERGY STATUS TO PENICILLIN: ICD-10-CM

## 2020-10-31 PROCEDURE — 99284 EMERGENCY DEPT VISIT MOD MDM: CPT

## 2020-10-31 NOTE — ED ADULT NURSE NOTE - OBJECTIVE STATEMENT
pt. presents to the ED picked up by EMS for unsteady gait on the street, pt. endorses taking methadone and heroine. Pt. in no distress requesting to sleep. Will continue to monitor.

## 2020-10-31 NOTE — ED ADULT NURSE NOTE - NSIMPLEMENTINTERV_GEN_ALL_ED
Implemented All Universal Safety Interventions:  [place] to call system. Call bell, personal items and telephone within reach. Instruct patient to call for assistance. Room bathroom lighting operational. Non-slip footwear when patient is off stretcher. Physically safe environment: no spills, clutter or unnecessary equipment. Stretcher in lowest position, wheels locked, appropriate side rails in place.

## 2020-10-31 NOTE — ED ADULT TRIAGE NOTE - CHIEF COMPLAINT QUOTE
PT biba for c/o AMS. PT reports heroine use with methadone today. Pt states "I don't know why they brought me here, I fell asleep on my way home". Pt requesting discharge so she can catch train before 1a when subway closes. Unsteady gait and drowsy in triage.

## 2020-11-01 NOTE — ED PROVIDER NOTE - PATIENT PORTAL LINK FT
You can access the FollowMyHealth Patient Portal offered by [facility] by registering at the following website: [email or web address]. By joining [employer]’s FollowMyHealth portal, you will also be able to view your health information using other applications (apps) compatible with our system.

## 2020-11-01 NOTE — ED PROVIDER NOTE - CLINICAL SUMMARY MEDICAL DECISION MAKING FREE TEXT BOX
substance abuse, no signs of trauma, not hypoglycemic, neuro exam non-focal, will observe and reassess frequently.

## 2020-11-01 NOTE — ED PROVIDER NOTE - OBJECTIVE STATEMENT
40 year old female with h/o substance abuse presents with AMS. admits to heroin and methadone use today. Pt states "I don't know why they brought me here, I fell asleep on my way home". Pt requesting discharge so she can catch train before 1a when subway closes. no trauma

## 2020-11-10 ENCOUNTER — EMERGENCY (EMERGENCY)
Facility: HOSPITAL | Age: 40
LOS: 1 days | Discharge: HOME | End: 2020-11-10
Admitting: EMERGENCY MEDICINE
Payer: MEDICAID

## 2020-11-10 VITALS
RESPIRATION RATE: 18 BRPM | TEMPERATURE: 98 F | DIASTOLIC BLOOD PRESSURE: 72 MMHG | OXYGEN SATURATION: 98 % | SYSTOLIC BLOOD PRESSURE: 109 MMHG | HEART RATE: 65 BPM

## 2020-11-10 VITALS
HEART RATE: 50 BPM | TEMPERATURE: 98 F | RESPIRATION RATE: 18 BRPM | HEIGHT: 63 IN | DIASTOLIC BLOOD PRESSURE: 54 MMHG | WEIGHT: 110.01 LBS | OXYGEN SATURATION: 97 % | SYSTOLIC BLOOD PRESSURE: 102 MMHG

## 2020-11-10 DIAGNOSIS — F11.10 OPIOID ABUSE, UNCOMPLICATED: ICD-10-CM

## 2020-11-10 DIAGNOSIS — S00.212A ABRASION OF LEFT EYELID AND PERIOCULAR AREA, INITIAL ENCOUNTER: ICD-10-CM
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
## 2020-11-10 DIAGNOSIS — R41.82 ALTERED MENTAL STATUS, UNSPECIFIED: ICD-10-CM
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
## 2020-11-10 PROCEDURE — 99284 EMERGENCY DEPT VISIT MOD MDM: CPT

## 2020-11-10 PROCEDURE — 93010 ELECTROCARDIOGRAM REPORT: CPT

## 2020-11-10 PROCEDURE — 70450 CT HEAD/BRAIN W/O DYE: CPT | Mod: 26

## 2020-11-10 NOTE — ED PROVIDER NOTE - PROGRESS NOTE DETAILS
patient now awake, alert, coherent, a&ox4, clear speech, steady gait. denies SI/HI. has no medical complaints. tolerating po. advised togo to detox.

## 2020-11-10 NOTE — ED ADULT NURSE NOTE - CHIEF COMPLAINT QUOTE
Patient to ED for Altered mental status due to polysubstance abuse.  Known to EMS for heroine use.  Arousable to luis

## 2020-11-10 NOTE — ED PROVIDER NOTE - OBJECTIVE STATEMENT
37 y/o female is BIB EMS due to AMS s/p heroine use. Pt endorses a small abrasion to the left eyebrow. Unable to cooperate with remainder of history. 35 y/o female is BIBEMS due to AMS s/p heroine use. + small abrasion to the left eyebrow. Unable to cooperate with remainder of history.

## 2020-11-10 NOTE — ED PROVIDER NOTE - CLINICAL SUMMARY MEDICAL DECISION MAKING FREE TEXT BOX
35 y/o female is BIB EMS for heroine use. Will order EKG, head CT to r/o trauma, and reassess. BIBEMS s/p heroine use. +left eyebrow abrasion, ct brain no acute pathology, will reassess once more sober. abrasions cleansed, dressed with bacitracin

## 2020-11-10 NOTE — ED ADULT NURSE NOTE - OBJECTIVE STATEMENT
biba, responsive to painful stimuli only, no s/s of distress, awaiting provider eval. no s/s of distress, will continue to monitor for change in assessment

## 2020-11-10 NOTE — ED PROVIDER NOTE - PATIENT PORTAL LINK FT
You can access the FollowMyHealth Patient Portal offered by [facility] by registering at the following website: [email or web address]. By joining [facility]’s FollowMyHealth portal, you will also be able to view your health information using other applications (apps) compatible with our system.

## 2020-11-10 NOTE — ED PROVIDER NOTE - NSFOLLOWUPINSTRUCTIONS_ED_ALL_ED_FT
GO to detox  Using drugs is dangerous for your health    Return to the Emergency Department for any concerns

## 2021-07-30 NOTE — ED ADULT NURSE NOTE - NSFALLRSKUNASSIST_ED_ALL_ED
You can access the FollowMyHealth Patient Portal offered by [facility] by registering at the following website: [email or web address]. By joining [employer]’s FollowMyHealth portal, you will also be able to view your health information using other applications (apps) compatible with our system. no

## 2022-10-28 NOTE — ED ADULT TRIAGE NOTE - NS ED NURSE DIRECT TO ROOM YN
Yes
Saurabh Geiger)  Pediatric Surgery; Surgery  [address], [address]  [place] [address]  Phone: [phone]  Fax: [phone]  Follow Up Time: 2 weeks

## 2022-12-16 ENCOUNTER — EMERGENCY (EMERGENCY)
Facility: HOSPITAL | Age: 42
LOS: 1 days | Discharge: HOME | End: 2022-12-16
Attending: EMERGENCY MEDICINE | Admitting: EMERGENCY MEDICINE

## 2022-12-16 VITALS
RESPIRATION RATE: 16 BRPM | SYSTOLIC BLOOD PRESSURE: 151 MMHG | TEMPERATURE: 99 F | HEART RATE: 108 BPM | DIASTOLIC BLOOD PRESSURE: 91 MMHG | WEIGHT: 148.37 LBS | OXYGEN SATURATION: 96 % | HEIGHT: 66 IN

## 2022-12-16 PROCEDURE — 99283 EMERGENCY DEPT VISIT LOW MDM: CPT

## 2022-12-16 RX ADMIN — Medication 300 MILLIGRAM(S): at 21:37

## 2022-12-16 NOTE — ED PROVIDER NOTE - CLINICAL SUMMARY MEDICAL DECISION MAKING FREE TEXT BOX
42-year-old female with multiple abscesses from IV drug use to bilateral upper extremity.  None are fluctuant and requiring I&D at this time.  Also a small area of cellulitis to dorsal aspect of right forearm without crepitus and without lymphatic streaking.  Afebrile in triage.  Plan for p.o. antibiotics and DC with outpatient substance abuse resources.  Discussed indication for patient return to ED.  Patient understood.

## 2022-12-16 NOTE — ED PROVIDER NOTE - PATIENT PORTAL LINK FT
You can access the FollowMyHealth Patient Portal offered by [facility] by registering at the following website: [email or web address]. By joining [employer]’s FollowMyHealth portal, you will also be able to view your health information using other applications (apps) compatible with our system.

## 2022-12-16 NOTE — ED PROVIDER NOTE - PHYSICAL EXAMINATION
GENERAL: well appearing, no acute distress   HEAD: atraumatic   EYES: EOMI   ENT: moist oral mucosa   CARDIAC: regular rate  RESPIRATORY: no increased work of breathing   MUSCULOSKELETAL: no deformity   NEUROLOGICAL: alert, spontaneous movement of extremities   SKIN: Abscesses to bilateral forearms in various stages of healing, minimal cellulitis to dorsal aspect of right forearm without crepitus or lymphatic streaking  PSYCHIATRIC: cooperative

## 2022-12-16 NOTE — ED ADULT NURSE NOTE - OBJECTIVE STATEMENT
Patient presents to ED c/o abscess to LT inner forearm and mild swelling with warmth to right forearm x 1 week. States that abscesses "popped". Denies f/c/n/v/d, cp, sob, cough. Pt aox4, MAEx4, spont unlabored breathing on ra.

## 2022-12-16 NOTE — ED PROVIDER NOTE - OBJECTIVE STATEMENT
42-year-old female past medical history of polysubstance abuse (uses IV heroin, smokes crack, buys Klonopin off the street) also on methadone presents with  with concern about chronic abscesses to bilateral arms from areas of injection.  Patient states multiple abscesses have spontaneously opened and that they are painful.  Went to [facility] a few days ago for evaluation and had antibiotics sent to pharmacy but patient was unable to get them as her Medicaid was not active.  Patient states her Medicaid is currently active and she is interested in detox.  Denies other acute complaints

## 2022-12-20 DIAGNOSIS — L02.413 CUTANEOUS ABSCESS OF RIGHT UPPER LIMB: ICD-10-CM

## 2022-12-20 DIAGNOSIS — L03.113 CELLULITIS OF RIGHT UPPER LIMB: ICD-10-CM

## 2022-12-20 DIAGNOSIS — L03.114 CELLULITIS OF LEFT UPPER LIMB: ICD-10-CM

## 2022-12-20 DIAGNOSIS — L02.414 CUTANEOUS ABSCESS OF LEFT UPPER LIMB: ICD-10-CM

## 2022-12-20 DIAGNOSIS — F14.90 COCAINE USE, UNSPECIFIED, UNCOMPLICATED: ICD-10-CM

## 2022-12-20 DIAGNOSIS — Z88.0 ALLERGY STATUS TO PENICILLIN: ICD-10-CM

## 2023-02-16 ENCOUNTER — EMERGENCY (EMERGENCY)
Facility: HOSPITAL | Age: 43
LOS: 1 days | Discharge: HOME | End: 2023-02-16
Admitting: EMERGENCY MEDICINE
Payer: MEDICAID

## 2023-02-16 VITALS
SYSTOLIC BLOOD PRESSURE: 153 MMHG | OXYGEN SATURATION: 95 % | TEMPERATURE: 98 F | HEART RATE: 94 BPM | HEIGHT: 64 IN | RESPIRATION RATE: 18 BRPM | DIASTOLIC BLOOD PRESSURE: 95 MMHG | WEIGHT: 145.06 LBS

## 2023-02-16 DIAGNOSIS — Z86.19 PERSONAL HISTORY OF OTHER INFECTIOUS AND PARASITIC DISEASES: ICD-10-CM

## 2023-02-16 DIAGNOSIS — Z88.0 ALLERGY STATUS TO PENICILLIN: ICD-10-CM

## 2023-02-16 DIAGNOSIS — S00.83XA CONTUSION OF OTHER PART OF HEAD, INITIAL ENCOUNTER: ICD-10-CM

## 2023-02-16 DIAGNOSIS — F17.200 NICOTINE DEPENDENCE, UNSPECIFIED, UNCOMPLICATED: ICD-10-CM

## 2023-02-16 DIAGNOSIS — R41.82 ALTERED MENTAL STATUS, UNSPECIFIED: ICD-10-CM
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
## 2023-02-16 DIAGNOSIS — L02.411 CUTANEOUS ABSCESS OF RIGHT AXILLA: ICD-10-CM

## 2023-02-16 DIAGNOSIS — L02.413 CUTANEOUS ABSCESS OF RIGHT UPPER LIMB: ICD-10-CM

## 2023-02-16 DIAGNOSIS — F19.10 OTHER PSYCHOACTIVE SUBSTANCE ABUSE, UNCOMPLICATED: ICD-10-CM

## 2023-02-16 LAB — GLUCOSE BLDC GLUCOMTR-MCNC: 83 MG/DL — SIGNIFICANT CHANGE UP (ref 70–99)

## 2023-02-16 PROCEDURE — 70450 CT HEAD/BRAIN W/O DYE: CPT | Mod: 26

## 2023-02-16 PROCEDURE — 99284 EMERGENCY DEPT VISIT MOD MDM: CPT | Mod: 25

## 2023-02-16 PROCEDURE — 70486 CT MAXILLOFACIAL W/O DYE: CPT | Mod: 26

## 2023-02-16 PROCEDURE — 10061 I&D ABSCESS COMP/MULTIPLE: CPT

## 2023-02-16 RX ORDER — IBUPROFEN 200 MG
1 TABLET ORAL
Qty: 20 | Refills: 0
Start: 2023-02-16

## 2023-02-16 RX ADMIN — Medication 500 MILLIGRAM(S): at 21:53

## 2023-02-16 RX ADMIN — Medication 100 MILLIGRAM(S): at 21:53

## 2023-02-16 NOTE — ED PROVIDER NOTE - NSFOLLOWUPINSTRUCTIONS_ED_ALL_ED_FT
Abscess    An abscess is an infected area that contains a collection of pus and debris. It can occur in almost any part of the body and occurs when the tissue gets infection. Symptoms include a painful mass that is red, warm, tender that might break open and HAVE drainage. If your health care provider gave you antibiotics make sure to take the full course and do not stop even if feeling better.     SEEK IMMEDIATE MEDICAL CARE IF YOU HAVE ANY OF THE FOLLOWING SYMPTOMS: chills, fever, muscle aches, or red streaking from the area.     Contusion    A contusion is a deep bruise. Contusions are the result of a blunt injury to tissues and muscle fibers under the skin. The skin overlying the contusion may turn blue, purple, or yellow. Symptoms also include pain and swelling in the injured area.    SEEK IMMEDIATE MEDICAL CARE IF YOU HAVE ANY OF THE FOLLOWING SYMPTOMS: severe pain, numbness, tingling, pain, weakness, or skin color/temperature change in any part of your body distal to the injury.    Follow up with your primary care doctor or clinics listed below if you do not have a doctor,    [address] [address]  To make an appointment, call [phone]    [facility] [place]  Address: [address] [place] [id number]  Appointment Center: [phone] ([phone])     [address] [place] [address] is a good referral line for crisis and substance abuse help.   has drop in programs all over the [facility].    Return to the ER for Emergencies.  Return immediately for any new or worsening symptoms or any new concerns

## 2023-02-16 NOTE — ED PROVIDER NOTE - NSICDXNOPASTSURGICALHX_GEN_ALL_ED
ICU Progress Note    Subjective:  reviewed overnight events, report from bedside nurse, stable.  fio2 80%, peep 12, proning.  Sedated.  On dopa for bradycardia.      Objective:    Vital signs for last 24 hours:  Temp:  [95.7 °F (35.4 °C)-97.5 °F (36.4 °C)] 97 °F (36.1 °C)  Heart Rate:  [46-59] 59  Resp:  [15-19] 18  BP: ()/(55-73) 119/73  Arterial Line BP: ()/(50-68) 140/68  FiO2 (%):  [50 %-90 %] 80 %    General:  no distress, prone    Lungs:   air entry bilaterally, no rhonchi, , pk 30, plat 25    Cardiac: S1S2    Abd: soft, nontender, no guarding or rebound    Neuro:  sedated    Extremities:  warm, dry, edematous    Skin:  no rash           Scheduled Meds:  • PARENTERAL NUTRITION - DIETITIAN/PHARMACIST MANAGED   Does not apply See Admin Instructions   • Potassium Standard Replacement Protocol   Does not apply See Admin Instructions   • Magnesium Standard Replacement Protocol   Does not apply See Admin Instructions   • Phosphorus Standard Replacement Protocol   Does not apply See Admin Instructions   • insulin lispro   Subcutaneous 4 times per day   • chlorhexidine gluconate  15 mL Swish & Spit 2 times per day   • petrolatum (white)-mineral oil  1 application Both Eyes 6x Daily   • acyclovir  200 mg Intravenous 2 times per day   • dexamethasone  6 mg Intravenous Daily   • [Held by provider] polyethylene glycol  17 g Oral Daily   • [Held by provider] senna  1 tablet Oral Nightly   • [Held by provider] cholecalciferol  125 mcg Oral Daily   • [Held by provider] zinc sulfate  220 mg Oral Daily with breakfast   • [Held by provider] ascorbic acid  500 mg Oral Daily   • sodium chloride (PF)  2 mL Intracatheter 2 times per day     Continuous Infusions:  • DOPamine (INTROPIN) 400 mg/250 mL in dextrose 5 % infusion 2.5 mcg/kg/min (10/24/21 1700)   • dextrose 10 % infusion     • parenteral nutrition adult custom central     • dextrose 10 % infusion 50 mL/hr at 10/24/21 1700   • MIDAZolam (VERSED) 100 mg/100  mL in saline infusion 10 mg/hr (10/24/21 1700)   • sodium chloride 0.9% infusion Stopped (10/23/21 1534)   • NORepinephrine (LEVOPHED) 8 mg/250 mL in sodium chloride 0.9 % infusion Stopped (10/24/21 0639)   • pantoprazole (PROTONIX) 80 mg/100mL in sodium chloride 0.9% infusion 8 mg/hr (10/24/21 1700)   • fentaNYL (SUBLIMAZE) 2,500 mcg/250 mL in sodium chloride 0.9 % infusion 250 mcg/hr (10/24/21 1700)     PRN Meds:dextrose, sodium chloride, sodium chloride, MIDAZolam, dextrose, dextrose, dextrose, dextrose, glucagon, bisacodyl, sodium chloride, sodium chloride, albuterol, acetaminophen, polyethylene glycol, docusate sodium-sennosides, acetaminophen, sodium chloride    Intake/Output last 3 shifts:  I/O last 3 completed shifts:  In: 2559.7 [I.V.:2086.1; IV Piggyback:473.7]  Out: 905 [Urine:905]  Intake/Output this shift:  I/O this shift:  In: 308.3 [I.V.:308.3]  Out: -     Vent settings for last 24 hours:  FiO2 (%):  [50 %-90 %] 80 %  S RR:  [16] 16  S VT:  [400 mL] 400 mL  PEEP/CPAP/EPAP:  [12 cm H20-13 cm H20] 12 cm H20  CT SUP:  [0 cm H20] 0 cm H20      Labs  Recent Labs   Lab 10/24/21  0359 10/23/21  0433 10/22/21  0403   SODIUM 139 141 145   POTASSIUM 4.2 4.4 3.6   CHLORIDE 108* 111* 116*   CO2 31 30 29   BUN 14 14 26*   CREATININE 0.51* 0.58* 0.69   GLUCOSE 181* 193* 75   CALCIUM 6.7* 7.6* 7.5*      Recent Labs   Lab 10/24/21  0359 10/23/21  0433 10/22/21  0403 10/21/21  0254 10/21/21  0029   SODIUM 139 141 145 141 138   CHLORIDE 108* 111* 116* 109* 106   CO2 31 30 29 24 23   BUN 14 14 26* 56* 67*   CREATININE 0.51* 0.58* 0.69 0.86 1.11   CALCIUM 6.7* 7.6* 7.5* 6.9* 6.8*   ALBUMIN 1.7*  --   --  1.9* 1.4*   BILIRUBIN 0.2  --   --  0.3 0.3   ALKPT 47  --   --  44* 54   GPT 26  --   --  28 34   AST 19  --   --  21 23   GLUCOSE 181* 193* 75 173* 220*      Recent Labs   Lab 10/24/21  1235 10/24/21  0359   WBC  --  10.6   RBC  --  2.46*   HGB 8.2* 7.5*   HCT  --  23.5*   PLT  --  72*      No results found for: APH,  APCO2, APO2, AHCO3, ASAT  Recent Labs   Lab 10/21/21  1111 10/21/21  0254   INR 1.1 1.2     Lab Results   Component Value Date    FERR 840 (H) 10/21/2021    FERR 1,254 (H) 10/05/2021    FERR 2,482 (H) 10/03/2021    DDIMER 3.14 (H) 10/24/2021    DDIMER 0.44 10/21/2021    DDIMER 0.48 10/21/2021         Today's imaging:  No results found.      Assessment/Plan:    1)covid 19, intubated 10/20  2)acute hypoxemic respiratory failure  3)gi bleed due to gastric ulcer-injected     Rec:  Decadron  ppi drip  TPN - no OG/NG per GI due to severity of ulcer  icu prophylaxis, no chemoppx due to gi bleed/ulcer  Venous doppler legs  [id number]    Jesse Neves M.D.   <-- Click to add NO significant Past Surgical History

## 2023-02-16 NOTE — ED PROVIDER NOTE - PATIENT PORTAL LINK FT
You can access the FollowMyHealth Patient Portal offered by [facility] by registering at the following website: [email or web address]. By joining [employer]’s FollowMyHealth portal, you will also be able to view your health information using other applications (apps) compatible with our system.
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
## 2023-02-16 NOTE — ED PROVIDER NOTE - CARE PROVIDER_API CALL
Garry Helms)  Orthopaedic Surgery Surgery  [address] [place] [address]  Phone: [phone]  Fax: [phone]  Follow Up Time:     your PMD,   Phone: (   )    -  Fax: (   )    -  Follow Up Time:

## 2023-02-16 NOTE — ED PROVIDER NOTE - PHYSICAL EXAMINATION
Gen - Unkempt F, somnolent but arousable to verbal stimuli, NAD, comfortable and non-toxic appearing  Skin - warm, dry, R medial AC region with 3x3cm fluctuant tender abscess with necrotic edges, 1x2cm superficial ulcerated wound to the lateral AC, 1x1cm superficial wound to the R distal forearm, +induration, no streaking, crepitus, or active bleeding or purulent dc, compartment soft, NV intact, +SILT, symmetric distal pulses   HEENT - AT/NC, pupils 2mm b/l, PERRL, R periorbital ecchymosis supralateral region, no crepitus or deformity, EOMI, no pain with EOM, no nasal discharge, airway patent, neck supple and FROM  CV - S1S2, R/R/R  Resp - CTAB, no r/r/w  GI - soft, ND, NT, no CVAT b/l   MS -   Neuro - somnolent but arousable to verbal stimuli, no focal neuro deficits, ambulatory without gait disturbance
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
## 2023-02-16 NOTE — ED PROVIDER NOTE - NSICDXPASTMEDICALHX_GEN_ALL_CORE_FT
PAST MEDICAL HISTORY:  Hepatitis C     Methadone maintenance therapy patient     Penicillin allergy

## 2023-02-16 NOTE — ED PROVIDER NOTE - CARE PLAN
Principal Discharge DX:	Abscess of multiple sites of upper arm  Secondary Diagnosis:	Facial contusion  Secondary Diagnosis:	Altered mental state  Secondary Diagnosis:	Polysubstance abuse   1

## 2023-02-16 NOTE — ED ADULT TRIAGE NOTE - ARRIVAL FROM
Street No Residual Tumor Seen Histology Text: There were no malignant cells seen in the sections examined.

## 2023-02-18 NOTE — DS
[facility] Detox Discharge Summary


Admission Date: 


02/09/18





Discharge Date: 02/13/18





- History


Present History: Sedative Dependence





- Physical Exam Results


Vital Signs: 


 Vital Signs











Temperature  97.3 F L  02/13/18 06:33


 


Pulse Rate  57 L  02/13/18 06:33


 


Respiratory Rate  16   02/13/18 06:33


 


Blood Pressure  136/87   02/13/18 06:33


 


O2 Sat by Pulse Oximetry (%)      











Pertinent Admission Physical Exam Findings: 





withdrawal sx


 Vital Signs











Temperature  97.3 F L  02/13/18 06:33


 


Pulse Rate  57 L  02/13/18 06:33


 


Respiratory Rate  16   02/13/18 06:33


 


Blood Pressure  136/87   02/13/18 06:33


 


O2 Sat by Pulse Oximetry (%)      








 Laboratory Last Values











WBC  5.2 K/mm3 (4.0-10.0)   02/11/18  08:00    


 


RBC  4.58 M/mm3 (3.60-5.2)   02/11/18  08:00    


 


Hgb  12.2 GM/dL (10.7-15.3)   02/11/18  08:00    


 


Hct  38.0 % (32.4-45.2)   02/11/18  08:00    


 


MCV  82.9 fl (80-96)   02/11/18  08:00    


 


MCH  26.6 pg (25.7-33.7)   02/11/18  08:00    


 


MCHC  32.0 g/dl (32.0-36.0)   02/11/18  08:00    


 


RDW  15.4 % (11.6-15.6)   02/11/18  08:00    


 


Plt Count  163 K/MM3 (134-434)   02/11/18  08:00    


 


MPV  8.8 fl (7.5-11.1)   02/11/18  08:00    


 


Sodium  140 mmol/L (136-145)   02/11/18  08:00    


 


Potassium  4.3 mmol/L (3.5-5.1)   02/11/18  08:00    


 


Chloride  105 mmol/L ()   02/11/18  08:00    


 


Carbon Dioxide  31 mmol/L (21-32)   02/11/18  08:00    


 


Anion Gap  4  (8-16)  L  02/11/18  08:00    


 


BUN  9 mg/dL (7-18)   02/11/18  08:00    


 


Creatinine  1.0 mg/dL (0.55-1.02)   02/11/18  08:00    


 


Creat Clearance w eGFR  > 60  (>60)   02/11/18  08:00    


 


Random Glucose  85 mg/dL ()   02/11/18  08:00    


 


Calcium  8.4 mg/dL (8.5-10.1)  L  02/11/18  08:00    


 


Total Bilirubin  0.5 mg/dL (0.2-1.0)   02/11/18  08:00    


 


AST  21 U/L (15-37)   02/11/18  08:00    


 


ALT  23 U/L (12-78)   02/11/18  08:00    


 


Alkaline Phosphatase  71 U/L ()   02/11/18  08:00    


 


Total Protein  6.3 g/dl (6.4-8.2)  L  02/11/18  08:00    


 


Albumin  3.4 g/dl (3.4-5.0)   02/11/18  08:00    


 


Urine Color  Yellow   02/09/18  22:59    


 


Urine Appearance  Slcloudy   02/09/18  22:59    


 


Urine pH  5.0  (5.0-8.0)   02/09/18  22:59    


 


Ur Specific Gravity  1.018  (1.001-1.035)   02/09/18  22:59    


 


Urine Protein  Negative  (NEGATIVE)   02/09/18  22:59    


 


Urine Glucose (UA)  Negative  (NEGATIVE)   02/09/18  22:59    


 


Urine Ketones  Negative  (NEGATIVE)   02/09/18  22:59    


 


Urine Blood  1+  (NEGATIVE)  H  02/09/18  22:59    


 


Urine Nitrite  Negative  (NEGATIVE)   02/09/18  22:59    


 


Urine Bilirubin  Negative  (NEGATIVE)   02/09/18  22:59    


 


Urine Urobilinogen  2.0 mg/dL (0.2-1.0)  H  02/09/18  22:59    


 


Ur Leukocyte Esterase  Trace  (NEGATIVE)   02/09/18  22:59    


 


Urine WBC (Auto)  1 /hpf (3-5)   02/09/18  22:59    


 


Urine RBC (Auto)  6 /hpf (0-3)   02/09/18  22:59    


 


Ur Epithelial Cells  Few /HPF (FEW)   02/09/18  22:59    


 


Urine Mucus  Rare   02/09/18  22:59    


 


RPR Titer  Nonreactive  (NONREACTIVE)   02/11/18  08:00    








lab noted





- Treatment


Hospital Course: Detox Protocol Followed, Detoxed Safely, Responded well, 

Discharged Condition Good, Rehab Referral Accepted


Patient has Accepted a Rehab Referral to: [facility]





- Medication


Discharge Medications: 


Ambulatory Orders





Sertraline HCl [Zoloft -] 100 mg PO DAILY 02/09/18 











- AMA


Did Patient Leave Against Medical Advice: No Dietitian Recommendations 



* Advance Jevity 1.2 to 45 mL/hr (goal) via GT

Provides: 1296 kcal, 60 g PRO, 872 mL water

Meets: 96% of upper est kcal, 111% of lower est PRO, 67% of upper fluid needs

* FWF of 100mL q6h, or per MD reccs

* Consider speech evaluation to assess if pt can have oral grat

* Consider DC D5



GS, MPH, RD



Please refer to RD Assessment for further details. Thanks!


-------------------------------------------------------------------------------

Addendum: 02/18/23 at 1011 by Maria Del Carmen Nice RD

-------------------------------------------------------------------------------

Amended: Links added.

## 2023-02-19 LAB
-  AMPICILLIN/SULBACTAM: SIGNIFICANT CHANGE UP
-  CEFAZOLIN: SIGNIFICANT CHANGE UP
-  CLINDAMYCIN: SIGNIFICANT CHANGE UP
-  ERYTHROMYCIN: SIGNIFICANT CHANGE UP
-  GENTAMICIN: SIGNIFICANT CHANGE UP
-  OXACILLIN: SIGNIFICANT CHANGE UP
-  RIFAMPIN: SIGNIFICANT CHANGE UP
-  TETRACYCLINE: SIGNIFICANT CHANGE UP
-  TRIMETHOPRIM/SULFAMETHOXAZOLE: SIGNIFICANT CHANGE UP
-  VANCOMYCIN: SIGNIFICANT CHANGE UP
METHOD TYPE: SIGNIFICANT CHANGE UP

## 2023-02-22 LAB
CULTURE RESULTS: SIGNIFICANT CHANGE UP
ORGANISM # SPEC MICROSCOPIC CNT: SIGNIFICANT CHANGE UP
ORGANISM # SPEC MICROSCOPIC CNT: SIGNIFICANT CHANGE UP
SPECIMEN SOURCE: SIGNIFICANT CHANGE UP

## 2023-04-17 ENCOUNTER — HOSPITAL ENCOUNTER (INPATIENT)
Dept: HOSPITAL 74 - YASAS | Age: 43
LOS: 5 days | Discharge: OTHER INSTITUTION | DRG: 773 | End: 2023-04-22
Attending: SURGERY | Admitting: ALLERGY & IMMUNOLOGY
Payer: COMMERCIAL

## 2023-04-17 VITALS — BODY MASS INDEX: 23.2 KG/M2

## 2023-04-17 DIAGNOSIS — F17.210: ICD-10-CM

## 2023-04-17 DIAGNOSIS — E16.2: ICD-10-CM

## 2023-04-17 DIAGNOSIS — F13.20: ICD-10-CM

## 2023-04-17 DIAGNOSIS — F10.230: Primary | ICD-10-CM

## 2023-04-17 DIAGNOSIS — F19.282: ICD-10-CM

## 2023-04-17 DIAGNOSIS — F11.20: ICD-10-CM

## 2023-04-17 DIAGNOSIS — Z88.0: ICD-10-CM

## 2023-04-17 DIAGNOSIS — Z88.8: ICD-10-CM

## 2023-04-17 DIAGNOSIS — I10: ICD-10-CM

## 2023-04-17 DIAGNOSIS — F32.9: ICD-10-CM

## 2023-04-17 DIAGNOSIS — F19.24: ICD-10-CM

## 2023-04-17 DIAGNOSIS — F14.20: ICD-10-CM

## 2023-04-17 DIAGNOSIS — F43.10: ICD-10-CM

## 2023-04-17 PROCEDURE — U0003 INFECTIOUS AGENT DETECTION BY NUCLEIC ACID (DNA OR RNA); SEVERE ACUTE RESPIRATORY SYNDROME CORONAVIRUS 2 (SARS-COV-2) (CORONAVIRUS DISEASE [COVID-19]), AMPLIFIED PROBE TECHNIQUE, MAKING USE OF HIGH THROUGHPUT TECHNOLOGIES AS DESCRIBED BY CMS-2020-01-R: HCPCS

## 2023-04-17 PROCEDURE — U0005 INFEC AGEN DETEC AMPLI PROBE: HCPCS

## 2023-04-18 PROCEDURE — HZ2ZZZZ DETOXIFICATION SERVICES FOR SUBSTANCE ABUSE TREATMENT: ICD-10-PCS | Performed by: SURGERY

## 2023-04-18 RX ADMIN — BACITRACIN ZINC SCH APPLIC: 500 OINTMENT TOPICAL at 11:56

## 2023-04-18 RX ADMIN — Medication SCH MG: at 22:12

## 2023-04-18 RX ADMIN — ACETAMINOPHEN PRN MG: 325 TABLET ORAL at 17:38

## 2023-04-18 RX ADMIN — BACITRACIN ZINC SCH: 500 OINTMENT TOPICAL at 22:12

## 2023-04-18 RX ADMIN — ACETAMINOPHEN PRN MG: 325 TABLET ORAL at 10:44

## 2023-04-18 RX ADMIN — LEVETIRACETAM SCH MG: 500 TABLET, FILM COATED ORAL at 10:43

## 2023-04-18 RX ADMIN — NICOTINE POLACRILEX PRN MG: 2 GUM, CHEWING ORAL at 10:46

## 2023-04-18 RX ADMIN — IBUPROFEN PRN MG: 600 TABLET, FILM COATED ORAL at 22:14

## 2023-04-18 RX ADMIN — LEVETIRACETAM SCH MG: 500 TABLET, FILM COATED ORAL at 00:41

## 2023-04-18 RX ADMIN — METHOCARBAMOL PRN MG: 500 TABLET ORAL at 22:14

## 2023-04-18 RX ADMIN — Medication SCH TAB: at 10:43

## 2023-04-18 RX ADMIN — Medication PRN MG: at 22:12

## 2023-04-18 RX ADMIN — BACITRACIN ZINC SCH APPLIC: 500 OINTMENT TOPICAL at 02:06

## 2023-04-18 RX ADMIN — LEVETIRACETAM SCH MG: 500 TABLET, FILM COATED ORAL at 22:12

## 2023-04-18 RX ADMIN — Medication PRN MG: at 02:12

## 2023-04-18 RX ADMIN — METHOCARBAMOL PRN MG: 500 TABLET ORAL at 02:12

## 2023-04-19 LAB
ALBUMIN SERPL-MCNC: 3.8 G/DL (ref 3.4–5)
ALP SERPL-CCNC: 68 U/L (ref 45–117)
ALT SERPL-CCNC: 18 U/L (ref 13–61)
ANION GAP SERPL CALC-SCNC: 5 MMOL/L (ref 8–16)
AST SERPL-CCNC: 17 U/L (ref 15–37)
BILIRUB SERPL-MCNC: 0.4 MG/DL (ref 0.2–1)
BUN SERPL-MCNC: 23.7 MG/DL (ref 7–18)
CALCIUM SERPL-MCNC: 9.4 MG/DL (ref 8.5–10.1)
CHLORIDE SERPL-SCNC: 104 MMOL/L (ref 98–107)
CO2 SERPL-SCNC: 30 MMOL/L (ref 21–32)
CREAT SERPL-MCNC: 1.1 MG/DL (ref 0.55–1.3)
DEPRECATED RDW RBC AUTO: 15.3 % (ref 11.6–15.6)
GLUCOSE SERPL-MCNC: 48 MG/DL (ref 74–106)
HCT VFR BLD CALC: 33.5 % (ref 32.4–45.2)
HGB BLD-MCNC: 11.1 GM/DL (ref 10.7–15.3)
MCH RBC QN AUTO: 24.8 PG (ref 25.7–33.7)
MCHC RBC AUTO-ENTMCNC: 33.1 G/DL (ref 32–36)
MCV RBC: 75 FL (ref 80–96)
PLATELET # BLD AUTO: 112 10^3/UL (ref 134–434)
PMV BLD: 8.8 FL (ref 7.5–11.1)
PROT SERPL-MCNC: 7.8 G/DL (ref 6.4–8.2)
RBC # BLD AUTO: 4.46 M/MM3 (ref 3.6–5.2)
SODIUM SERPL-SCNC: 138 MMOL/L (ref 136–145)
WBC # BLD AUTO: 5.9 K/MM3 (ref 4–10)

## 2023-04-19 RX ADMIN — BACITRACIN ZINC SCH APPLIC: 500 OINTMENT TOPICAL at 10:22

## 2023-04-19 RX ADMIN — NICOTINE POLACRILEX PRN MG: 2 GUM, CHEWING ORAL at 06:00

## 2023-04-19 RX ADMIN — NICOTINE POLACRILEX PRN MG: 2 GUM, CHEWING ORAL at 14:33

## 2023-04-19 RX ADMIN — NICOTINE SCH MG: 14 PATCH, EXTENDED RELEASE TRANSDERMAL at 12:19

## 2023-04-19 RX ADMIN — Medication SCH MG: at 22:37

## 2023-04-19 RX ADMIN — LEVETIRACETAM SCH MG: 500 TABLET, FILM COATED ORAL at 22:37

## 2023-04-19 RX ADMIN — BACITRACIN ZINC SCH: 500 OINTMENT TOPICAL at 22:37

## 2023-04-19 RX ADMIN — NICOTINE POLACRILEX PRN MG: 2 GUM, CHEWING ORAL at 22:58

## 2023-04-19 RX ADMIN — METHOCARBAMOL PRN MG: 500 TABLET ORAL at 22:38

## 2023-04-19 RX ADMIN — SERTRALINE HYDROCHLORIDE SCH MG: 50 TABLET ORAL at 10:21

## 2023-04-19 RX ADMIN — LEVETIRACETAM SCH MG: 500 TABLET, FILM COATED ORAL at 10:21

## 2023-04-19 RX ADMIN — Medication PRN MG: at 22:39

## 2023-04-19 RX ADMIN — Medication SCH TAB: at 10:22

## 2023-04-19 RX ADMIN — NICOTINE POLACRILEX PRN MG: 2 GUM, CHEWING ORAL at 08:47

## 2023-04-19 RX ADMIN — DIPHENHYDRAMINE HCL PRN MG: 25 CAPSULE ORAL at 18:38

## 2023-04-19 RX ADMIN — IBUPROFEN PRN MG: 600 TABLET, FILM COATED ORAL at 05:58

## 2023-04-19 RX ADMIN — ACETAMINOPHEN PRN MG: 325 TABLET ORAL at 22:38

## 2023-04-19 RX ADMIN — NICOTINE POLACRILEX PRN MG: 2 GUM, CHEWING ORAL at 17:49

## 2023-04-20 LAB — HIV 1+2 AB+HIV1 P24 AG SERPL QL IA: NEGATIVE

## 2023-04-20 RX ADMIN — DIPHENHYDRAMINE HCL PRN MG: 25 CAPSULE ORAL at 22:44

## 2023-04-20 RX ADMIN — Medication PRN MG: at 22:10

## 2023-04-20 RX ADMIN — SERTRALINE HYDROCHLORIDE SCH MG: 50 TABLET ORAL at 10:18

## 2023-04-20 RX ADMIN — NICOTINE POLACRILEX PRN MG: 2 GUM, CHEWING ORAL at 10:22

## 2023-04-20 RX ADMIN — METHOCARBAMOL PRN MG: 500 TABLET ORAL at 22:11

## 2023-04-20 RX ADMIN — NICOTINE POLACRILEX PRN MG: 2 GUM, CHEWING ORAL at 22:13

## 2023-04-20 RX ADMIN — LEVETIRACETAM SCH MG: 500 TABLET, FILM COATED ORAL at 10:17

## 2023-04-20 RX ADMIN — NICOTINE POLACRILEX PRN MG: 2 GUM, CHEWING ORAL at 17:41

## 2023-04-20 RX ADMIN — Medication SCH MG: at 22:08

## 2023-04-20 RX ADMIN — Medication SCH TAB: at 10:17

## 2023-04-20 RX ADMIN — NICOTINE SCH MG: 14 PATCH, EXTENDED RELEASE TRANSDERMAL at 10:18

## 2023-04-20 RX ADMIN — BACITRACIN ZINC SCH: 500 OINTMENT TOPICAL at 22:08

## 2023-04-20 RX ADMIN — NICOTINE POLACRILEX PRN MG: 2 GUM, CHEWING ORAL at 14:48

## 2023-04-20 RX ADMIN — NICOTINE POLACRILEX PRN MG: 2 GUM, CHEWING ORAL at 03:51

## 2023-04-20 RX ADMIN — BACITRACIN ZINC SCH APPLIC: 500 OINTMENT TOPICAL at 10:18

## 2023-04-20 RX ADMIN — LEVETIRACETAM SCH MG: 500 TABLET, FILM COATED ORAL at 22:08

## 2023-04-20 RX ADMIN — DIPHENHYDRAMINE HCL PRN MG: 25 CAPSULE ORAL at 10:21

## 2023-04-20 RX ADMIN — ACETAMINOPHEN PRN MG: 325 TABLET ORAL at 17:42

## 2023-04-21 RX ADMIN — SERTRALINE HYDROCHLORIDE SCH MG: 50 TABLET ORAL at 09:34

## 2023-04-21 RX ADMIN — DIPHENHYDRAMINE HCL PRN MG: 25 CAPSULE ORAL at 12:56

## 2023-04-21 RX ADMIN — BACITRACIN ZINC SCH APPLIC: 500 OINTMENT TOPICAL at 09:33

## 2023-04-21 RX ADMIN — NICOTINE POLACRILEX PRN MG: 2 GUM, CHEWING ORAL at 12:57

## 2023-04-21 RX ADMIN — BACITRACIN ZINC SCH APPLIC: 500 OINTMENT TOPICAL at 22:10

## 2023-04-21 RX ADMIN — METHOCARBAMOL PRN MG: 500 TABLET ORAL at 22:12

## 2023-04-21 RX ADMIN — DIPHENHYDRAMINE HCL PRN MG: 25 CAPSULE ORAL at 22:12

## 2023-04-21 RX ADMIN — Medication SCH MG: at 22:10

## 2023-04-21 RX ADMIN — NICOTINE POLACRILEX PRN MG: 2 GUM, CHEWING ORAL at 21:00

## 2023-04-21 RX ADMIN — LEVETIRACETAM SCH MG: 500 TABLET, FILM COATED ORAL at 09:34

## 2023-04-21 RX ADMIN — Medication SCH TAB: at 09:34

## 2023-04-21 RX ADMIN — ACETAMINOPHEN PRN MG: 325 TABLET ORAL at 17:37

## 2023-04-21 RX ADMIN — NICOTINE POLACRILEX PRN MG: 2 GUM, CHEWING ORAL at 09:37

## 2023-04-21 RX ADMIN — LEVETIRACETAM SCH MG: 500 TABLET, FILM COATED ORAL at 22:10

## 2023-04-21 RX ADMIN — NICOTINE SCH MG: 14 PATCH, EXTENDED RELEASE TRANSDERMAL at 09:33

## 2023-04-22 ENCOUNTER — HOSPITAL ENCOUNTER (INPATIENT)
Dept: HOSPITAL 74 - YASAS | Age: 43
LOS: 16 days | Discharge: OTHER INSTITUTION | DRG: 772 | End: 2023-05-08
Attending: PSYCHIATRY & NEUROLOGY | Admitting: ALLERGY & IMMUNOLOGY
Payer: COMMERCIAL

## 2023-04-22 VITALS — TEMPERATURE: 97.7 F

## 2023-04-22 VITALS — RESPIRATION RATE: 16 BRPM | SYSTOLIC BLOOD PRESSURE: 125 MMHG | DIASTOLIC BLOOD PRESSURE: 79 MMHG | HEART RATE: 60 BPM

## 2023-04-22 DIAGNOSIS — F19.282: ICD-10-CM

## 2023-04-22 DIAGNOSIS — Z88.0: ICD-10-CM

## 2023-04-22 DIAGNOSIS — F19.24: ICD-10-CM

## 2023-04-22 DIAGNOSIS — F17.210: ICD-10-CM

## 2023-04-22 DIAGNOSIS — K08.89: ICD-10-CM

## 2023-04-22 DIAGNOSIS — F13.20: ICD-10-CM

## 2023-04-22 DIAGNOSIS — F11.20: Primary | ICD-10-CM

## 2023-04-22 DIAGNOSIS — F14.20: ICD-10-CM

## 2023-04-22 DIAGNOSIS — F32.A: ICD-10-CM

## 2023-04-22 DIAGNOSIS — Z88.8: ICD-10-CM

## 2023-04-22 PROCEDURE — HZ42ZZZ GROUP COUNSELING FOR SUBSTANCE ABUSE TREATMENT, COGNITIVE-BEHAVIORAL: ICD-10-PCS | Performed by: PSYCHIATRY & NEUROLOGY

## 2023-04-22 RX ADMIN — Medication SCH TAB: at 10:12

## 2023-04-22 RX ADMIN — ALBUTEROL SULFATE SCH PUFF: 90 AEROSOL, METERED RESPIRATORY (INHALATION) at 22:02

## 2023-04-22 RX ADMIN — SERTRALINE HYDROCHLORIDE SCH MG: 50 TABLET ORAL at 10:11

## 2023-04-22 RX ADMIN — Medication SCH: at 16:25

## 2023-04-22 RX ADMIN — NICOTINE POLACRILEX PRN MG: 2 GUM, CHEWING ORAL at 10:14

## 2023-04-22 RX ADMIN — LEVETIRACETAM SCH MG: 500 TABLET, FILM COATED ORAL at 10:11

## 2023-04-22 RX ADMIN — Medication SCH MG: at 22:00

## 2023-04-22 RX ADMIN — NICOTINE SCH MG: 14 PATCH, EXTENDED RELEASE TRANSDERMAL at 10:12

## 2023-04-22 RX ADMIN — ALBUTEROL SULFATE SCH: 90 AEROSOL, METERED RESPIRATORY (INHALATION) at 16:26

## 2023-04-22 RX ADMIN — BACITRACIN ZINC SCH: 500 OINTMENT TOPICAL at 10:12

## 2023-04-22 RX ADMIN — HYDROXYZINE PAMOATE PRN MG: 25 CAPSULE ORAL at 22:03

## 2023-04-23 RX ADMIN — ALBUTEROL SULFATE SCH: 90 AEROSOL, METERED RESPIRATORY (INHALATION) at 06:16

## 2023-04-23 RX ADMIN — ALBUTEROL SULFATE SCH: 90 AEROSOL, METERED RESPIRATORY (INHALATION) at 02:30

## 2023-04-23 RX ADMIN — GABAPENTIN SCH MG: 300 CAPSULE ORAL at 13:57

## 2023-04-23 RX ADMIN — SERTRALINE HYDROCHLORIDE SCH MG: 50 TABLET ORAL at 10:03

## 2023-04-23 RX ADMIN — HYDROXYZINE PAMOATE PRN MG: 25 CAPSULE ORAL at 21:05

## 2023-04-23 RX ADMIN — ALBUTEROL SULFATE SCH: 90 AEROSOL, METERED RESPIRATORY (INHALATION) at 00:02

## 2023-04-23 RX ADMIN — Medication SCH MG: at 21:02

## 2023-04-23 RX ADMIN — Medication SCH TAB: at 10:03

## 2023-04-23 RX ADMIN — ALBUTEROL SULFATE SCH: 90 AEROSOL, METERED RESPIRATORY (INHALATION) at 10:02

## 2023-04-23 RX ADMIN — ALBUTEROL SULFATE SCH: 90 AEROSOL, METERED RESPIRATORY (INHALATION) at 14:30

## 2023-04-23 RX ADMIN — ALBUTEROL SULFATE SCH: 90 AEROSOL, METERED RESPIRATORY (INHALATION) at 18:39

## 2023-04-23 RX ADMIN — ACETAMINOPHEN PRN MG: 325 TABLET ORAL at 06:17

## 2023-04-23 RX ADMIN — NICOTINE POLACRILEX PRN MG: 4 GUM, CHEWING ORAL at 21:06

## 2023-04-23 RX ADMIN — NICOTINE POLACRILEX PRN MG: 4 GUM, CHEWING ORAL at 13:56

## 2023-04-23 RX ADMIN — NICOTINE POLACRILEX PRN MG: 4 GUM, CHEWING ORAL at 18:38

## 2023-04-23 RX ADMIN — PRAZOSIN HYDROCHLORIDE SCH MG: 1 CAPSULE ORAL at 21:04

## 2023-04-23 RX ADMIN — ALBUTEROL SULFATE SCH: 90 AEROSOL, METERED RESPIRATORY (INHALATION) at 23:47

## 2023-04-23 RX ADMIN — GABAPENTIN SCH MG: 300 CAPSULE ORAL at 21:02

## 2023-04-24 RX ADMIN — ALBUTEROL SULFATE SCH: 90 AEROSOL, METERED RESPIRATORY (INHALATION) at 06:24

## 2023-04-24 RX ADMIN — IBUPROFEN PRN MG: 600 TABLET, FILM COATED ORAL at 21:45

## 2023-04-24 RX ADMIN — Medication SCH TAB: at 09:39

## 2023-04-24 RX ADMIN — ALBUTEROL SULFATE SCH: 90 AEROSOL, METERED RESPIRATORY (INHALATION) at 21:46

## 2023-04-24 RX ADMIN — HYDROXYZINE PAMOATE PRN MG: 25 CAPSULE ORAL at 21:06

## 2023-04-24 RX ADMIN — NICOTINE POLACRILEX PRN MG: 4 GUM, CHEWING ORAL at 13:07

## 2023-04-24 RX ADMIN — SERTRALINE HYDROCHLORIDE SCH MG: 50 TABLET ORAL at 09:39

## 2023-04-24 RX ADMIN — Medication SCH MG: at 21:08

## 2023-04-24 RX ADMIN — GABAPENTIN SCH MG: 300 CAPSULE ORAL at 21:06

## 2023-04-24 RX ADMIN — Medication SCH MG: at 21:06

## 2023-04-24 RX ADMIN — NICOTINE POLACRILEX PRN MG: 4 GUM, CHEWING ORAL at 05:44

## 2023-04-24 RX ADMIN — ALBUTEROL SULFATE SCH: 90 AEROSOL, METERED RESPIRATORY (INHALATION) at 16:34

## 2023-04-24 RX ADMIN — ALBUTEROL SULFATE SCH: 90 AEROSOL, METERED RESPIRATORY (INHALATION) at 04:04

## 2023-04-24 RX ADMIN — ALBUTEROL SULFATE SCH: 90 AEROSOL, METERED RESPIRATORY (INHALATION) at 09:40

## 2023-04-24 RX ADMIN — NICOTINE POLACRILEX PRN MG: 4 GUM, CHEWING ORAL at 21:09

## 2023-04-24 RX ADMIN — HYDROXYZINE PAMOATE PRN MG: 25 CAPSULE ORAL at 09:41

## 2023-04-24 RX ADMIN — GABAPENTIN SCH MG: 300 CAPSULE ORAL at 13:38

## 2023-04-24 RX ADMIN — GABAPENTIN SCH MG: 300 CAPSULE ORAL at 05:42

## 2023-04-24 RX ADMIN — NICOTINE POLACRILEX PRN MG: 4 GUM, CHEWING ORAL at 09:06

## 2023-04-24 RX ADMIN — NICOTINE POLACRILEX PRN MG: 4 GUM, CHEWING ORAL at 17:39

## 2023-04-24 RX ADMIN — ALBUTEROL SULFATE SCH: 90 AEROSOL, METERED RESPIRATORY (INHALATION) at 17:49

## 2023-04-24 RX ADMIN — PRAZOSIN HYDROCHLORIDE SCH MG: 1 CAPSULE ORAL at 21:44

## 2023-04-25 RX ADMIN — GABAPENTIN SCH MG: 300 CAPSULE ORAL at 06:36

## 2023-04-25 RX ADMIN — PRAZOSIN HYDROCHLORIDE SCH MG: 1 CAPSULE ORAL at 21:11

## 2023-04-25 RX ADMIN — Medication SCH MG: at 21:11

## 2023-04-25 RX ADMIN — SERTRALINE HYDROCHLORIDE SCH MG: 50 TABLET ORAL at 10:01

## 2023-04-25 RX ADMIN — GABAPENTIN SCH MG: 300 CAPSULE ORAL at 21:11

## 2023-04-25 RX ADMIN — NICOTINE POLACRILEX PRN MG: 4 GUM, CHEWING ORAL at 10:02

## 2023-04-25 RX ADMIN — NICOTINE SCH MG: 14 PATCH, EXTENDED RELEASE TRANSDERMAL at 10:01

## 2023-04-25 RX ADMIN — NICOTINE POLACRILEX PRN MG: 4 GUM, CHEWING ORAL at 06:39

## 2023-04-25 RX ADMIN — HYDROXYZINE PAMOATE PRN MG: 25 CAPSULE ORAL at 21:13

## 2023-04-25 RX ADMIN — BACITRACIN ZINC SCH GM: 500 OINTMENT TOPICAL at 13:37

## 2023-04-25 RX ADMIN — BACITRACIN ZINC SCH GM: 500 OINTMENT TOPICAL at 21:47

## 2023-04-25 RX ADMIN — NICOTINE POLACRILEX PRN MG: 4 GUM, CHEWING ORAL at 21:13

## 2023-04-25 RX ADMIN — METHOCARBAMOL PRN MG: 500 TABLET ORAL at 21:11

## 2023-04-25 RX ADMIN — NICOTINE POLACRILEX PRN MG: 4 GUM, CHEWING ORAL at 13:39

## 2023-04-25 RX ADMIN — ACETAMINOPHEN PRN MG: 325 TABLET ORAL at 21:12

## 2023-04-25 RX ADMIN — Medication SCH TAB: at 10:01

## 2023-04-25 RX ADMIN — GABAPENTIN SCH MG: 300 CAPSULE ORAL at 13:39

## 2023-04-25 RX ADMIN — NICOTINE POLACRILEX PRN MG: 4 GUM, CHEWING ORAL at 17:50

## 2023-04-26 RX ADMIN — METHOCARBAMOL PRN MG: 500 TABLET ORAL at 12:39

## 2023-04-26 RX ADMIN — IBUPROFEN PRN MG: 600 TABLET, FILM COATED ORAL at 12:39

## 2023-04-26 RX ADMIN — SERTRALINE HYDROCHLORIDE SCH MG: 50 TABLET ORAL at 09:39

## 2023-04-26 RX ADMIN — NICOTINE POLACRILEX PRN MG: 4 GUM, CHEWING ORAL at 21:23

## 2023-04-26 RX ADMIN — HYDROXYZINE PAMOATE PRN MG: 25 CAPSULE ORAL at 21:20

## 2023-04-26 RX ADMIN — METHOCARBAMOL PRN MG: 500 TABLET ORAL at 21:21

## 2023-04-26 RX ADMIN — NICOTINE SCH MG: 14 PATCH, EXTENDED RELEASE TRANSDERMAL at 09:38

## 2023-04-26 RX ADMIN — GABAPENTIN SCH MG: 300 CAPSULE ORAL at 21:21

## 2023-04-26 RX ADMIN — PRAZOSIN HYDROCHLORIDE SCH MG: 1 CAPSULE ORAL at 21:21

## 2023-04-26 RX ADMIN — GABAPENTIN SCH MG: 300 CAPSULE ORAL at 14:25

## 2023-04-26 RX ADMIN — GABAPENTIN SCH MG: 300 CAPSULE ORAL at 06:11

## 2023-04-26 RX ADMIN — Medication SCH MG: at 21:19

## 2023-04-26 RX ADMIN — NICOTINE POLACRILEX PRN MG: 4 GUM, CHEWING ORAL at 18:12

## 2023-04-26 RX ADMIN — HYDROXYZINE PAMOATE PRN MG: 25 CAPSULE ORAL at 14:37

## 2023-04-26 RX ADMIN — NICOTINE POLACRILEX PRN MG: 4 GUM, CHEWING ORAL at 06:11

## 2023-04-26 RX ADMIN — BACITRACIN ZINC SCH GM: 500 OINTMENT TOPICAL at 09:38

## 2023-04-26 RX ADMIN — IBUPROFEN PRN MG: 400 TABLET, FILM COATED ORAL at 21:21

## 2023-04-26 RX ADMIN — Medication SCH MG: at 21:21

## 2023-04-26 RX ADMIN — NICOTINE POLACRILEX PRN MG: 4 GUM, CHEWING ORAL at 14:36

## 2023-04-26 RX ADMIN — NICOTINE POLACRILEX PRN MG: 4 GUM, CHEWING ORAL at 09:40

## 2023-04-26 RX ADMIN — Medication SCH TAB: at 09:39

## 2023-04-26 RX ADMIN — NICOTINE POLACRILEX PRN MG: 4 GUM, CHEWING ORAL at 12:33

## 2023-04-26 RX ADMIN — BACITRACIN ZINC SCH GM: 500 OINTMENT TOPICAL at 21:21

## 2023-04-27 RX ADMIN — METHOCARBAMOL PRN MG: 500 TABLET ORAL at 15:44

## 2023-04-27 RX ADMIN — PRAZOSIN HYDROCHLORIDE SCH MG: 1 CAPSULE ORAL at 21:24

## 2023-04-27 RX ADMIN — GABAPENTIN SCH MG: 300 CAPSULE ORAL at 06:01

## 2023-04-27 RX ADMIN — SERTRALINE HYDROCHLORIDE SCH MG: 50 TABLET ORAL at 09:05

## 2023-04-27 RX ADMIN — METHOCARBAMOL PRN MG: 500 TABLET ORAL at 09:06

## 2023-04-27 RX ADMIN — BACITRACIN ZINC SCH GM: 500 OINTMENT TOPICAL at 09:05

## 2023-04-27 RX ADMIN — IBUPROFEN PRN MG: 600 TABLET, FILM COATED ORAL at 09:06

## 2023-04-27 RX ADMIN — BACITRACIN ZINC SCH GM: 500 OINTMENT TOPICAL at 21:24

## 2023-04-27 RX ADMIN — GABAPENTIN SCH MG: 400 CAPSULE ORAL at 13:10

## 2023-04-27 RX ADMIN — Medication SCH TAB: at 09:05

## 2023-04-27 RX ADMIN — NICOTINE POLACRILEX PRN MG: 4 GUM, CHEWING ORAL at 09:01

## 2023-04-27 RX ADMIN — GABAPENTIN SCH MG: 400 CAPSULE ORAL at 21:24

## 2023-04-27 RX ADMIN — IBUPROFEN PRN MG: 600 TABLET, FILM COATED ORAL at 15:44

## 2023-04-27 RX ADMIN — METHOCARBAMOL PRN MG: 500 TABLET ORAL at 21:24

## 2023-04-27 RX ADMIN — Medication SCH MG: at 21:24

## 2023-04-27 RX ADMIN — NICOTINE POLACRILEX PRN MG: 4 GUM, CHEWING ORAL at 21:38

## 2023-04-27 RX ADMIN — NICOTINE POLACRILEX PRN MG: 4 GUM, CHEWING ORAL at 12:24

## 2023-04-27 RX ADMIN — NICOTINE POLACRILEX PRN MG: 4 GUM, CHEWING ORAL at 15:46

## 2023-04-27 RX ADMIN — NICOTINE SCH MG: 14 PATCH, EXTENDED RELEASE TRANSDERMAL at 09:05

## 2023-04-28 RX ADMIN — IBUPROFEN PRN MG: 600 TABLET, FILM COATED ORAL at 09:33

## 2023-04-28 RX ADMIN — BACITRACIN ZINC SCH GM: 500 OINTMENT TOPICAL at 09:31

## 2023-04-28 RX ADMIN — Medication SCH TAB: at 09:30

## 2023-04-28 RX ADMIN — METHOCARBAMOL PRN MG: 500 TABLET ORAL at 09:33

## 2023-04-28 RX ADMIN — BACITRACIN ZINC SCH GM: 500 OINTMENT TOPICAL at 21:14

## 2023-04-28 RX ADMIN — NICOTINE POLACRILEX PRN MG: 4 GUM, CHEWING ORAL at 09:32

## 2023-04-28 RX ADMIN — Medication SCH MG: at 21:14

## 2023-04-28 RX ADMIN — NICOTINE POLACRILEX PRN MG: 4 GUM, CHEWING ORAL at 19:48

## 2023-04-28 RX ADMIN — HYDROXYZINE PAMOATE PRN MG: 25 CAPSULE ORAL at 21:15

## 2023-04-28 RX ADMIN — NICOTINE POLACRILEX PRN MG: 4 GUM, CHEWING ORAL at 06:34

## 2023-04-28 RX ADMIN — GABAPENTIN SCH MG: 400 CAPSULE ORAL at 14:02

## 2023-04-28 RX ADMIN — SERTRALINE HYDROCHLORIDE SCH MG: 50 TABLET ORAL at 09:30

## 2023-04-28 RX ADMIN — IBUPROFEN PRN MG: 400 TABLET, FILM COATED ORAL at 21:15

## 2023-04-28 RX ADMIN — NICOTINE POLACRILEX PRN MG: 4 GUM, CHEWING ORAL at 14:02

## 2023-04-28 RX ADMIN — PRAZOSIN HYDROCHLORIDE SCH MG: 1 CAPSULE ORAL at 21:14

## 2023-04-28 RX ADMIN — GABAPENTIN SCH MG: 400 CAPSULE ORAL at 06:30

## 2023-04-28 RX ADMIN — GABAPENTIN SCH MG: 400 CAPSULE ORAL at 21:15

## 2023-04-28 RX ADMIN — NICOTINE SCH MG: 14 PATCH, EXTENDED RELEASE TRANSDERMAL at 09:30

## 2023-04-29 RX ADMIN — NICOTINE POLACRILEX PRN MG: 4 GUM, CHEWING ORAL at 14:20

## 2023-04-29 RX ADMIN — GABAPENTIN SCH MG: 400 CAPSULE ORAL at 06:26

## 2023-04-29 RX ADMIN — BACITRACIN ZINC SCH GM: 500 OINTMENT TOPICAL at 10:29

## 2023-04-29 RX ADMIN — NICOTINE POLACRILEX PRN MG: 4 GUM, CHEWING ORAL at 18:00

## 2023-04-29 RX ADMIN — GABAPENTIN SCH MG: 400 CAPSULE ORAL at 14:20

## 2023-04-29 RX ADMIN — NICOTINE SCH MG: 14 PATCH, EXTENDED RELEASE TRANSDERMAL at 10:29

## 2023-04-29 RX ADMIN — IBUPROFEN PRN MG: 600 TABLET, FILM COATED ORAL at 10:30

## 2023-04-29 RX ADMIN — Medication SCH TAB: at 10:24

## 2023-04-29 RX ADMIN — HYDROXYZINE PAMOATE PRN MG: 25 CAPSULE ORAL at 21:37

## 2023-04-29 RX ADMIN — PRAZOSIN HYDROCHLORIDE SCH MG: 1 CAPSULE ORAL at 21:36

## 2023-04-29 RX ADMIN — Medication SCH MG: at 21:36

## 2023-04-29 RX ADMIN — SERTRALINE HYDROCHLORIDE SCH MG: 50 TABLET ORAL at 10:24

## 2023-04-29 RX ADMIN — NICOTINE POLACRILEX PRN MG: 4 GUM, CHEWING ORAL at 06:29

## 2023-04-29 RX ADMIN — GABAPENTIN SCH MG: 400 CAPSULE ORAL at 21:36

## 2023-04-29 RX ADMIN — NICOTINE POLACRILEX PRN MG: 4 GUM, CHEWING ORAL at 21:38

## 2023-04-29 RX ADMIN — HYDROXYZINE PAMOATE PRN MG: 25 CAPSULE ORAL at 11:21

## 2023-04-29 RX ADMIN — BACITRACIN ZINC SCH GM: 500 OINTMENT TOPICAL at 21:36

## 2023-04-30 RX ADMIN — HYDROXYZINE PAMOATE PRN MG: 25 CAPSULE ORAL at 18:05

## 2023-04-30 RX ADMIN — BACITRACIN ZINC SCH GM: 500 OINTMENT TOPICAL at 10:31

## 2023-04-30 RX ADMIN — GABAPENTIN SCH MG: 400 CAPSULE ORAL at 06:29

## 2023-04-30 RX ADMIN — NICOTINE POLACRILEX PRN MG: 4 GUM, CHEWING ORAL at 10:31

## 2023-04-30 RX ADMIN — BACITRACIN ZINC SCH: 500 OINTMENT TOPICAL at 21:54

## 2023-04-30 RX ADMIN — Medication SCH TAB: at 10:30

## 2023-04-30 RX ADMIN — GABAPENTIN SCH MG: 400 CAPSULE ORAL at 21:45

## 2023-04-30 RX ADMIN — SERTRALINE HYDROCHLORIDE SCH MG: 50 TABLET ORAL at 10:31

## 2023-04-30 RX ADMIN — NICOTINE SCH MG: 14 PATCH, EXTENDED RELEASE TRANSDERMAL at 10:31

## 2023-04-30 RX ADMIN — Medication SCH MG: at 21:45

## 2023-04-30 RX ADMIN — NICOTINE POLACRILEX PRN MG: 4 GUM, CHEWING ORAL at 21:47

## 2023-04-30 RX ADMIN — NICOTINE POLACRILEX PRN MG: 4 GUM, CHEWING ORAL at 14:43

## 2023-04-30 RX ADMIN — NICOTINE PRN MG: 4 INHALANT RESPIRATORY (INHALATION) at 18:05

## 2023-04-30 RX ADMIN — GABAPENTIN SCH MG: 400 CAPSULE ORAL at 14:42

## 2023-04-30 RX ADMIN — NICOTINE POLACRILEX PRN MG: 4 GUM, CHEWING ORAL at 06:31

## 2023-04-30 RX ADMIN — HYDROXYZINE PAMOATE PRN MG: 25 CAPSULE ORAL at 21:46

## 2023-04-30 RX ADMIN — NICOTINE PRN MG: 4 INHALANT RESPIRATORY (INHALATION) at 21:50

## 2023-04-30 RX ADMIN — PRAZOSIN HYDROCHLORIDE SCH MG: 1 CAPSULE ORAL at 21:45

## 2023-04-30 RX ADMIN — NICOTINE PRN MG: 4 INHALANT RESPIRATORY (INHALATION) at 10:31

## 2023-05-01 RX ADMIN — GABAPENTIN SCH MG: 400 CAPSULE ORAL at 06:22

## 2023-05-01 RX ADMIN — Medication SCH MG: at 21:09

## 2023-05-01 RX ADMIN — SERTRALINE HYDROCHLORIDE SCH MG: 50 TABLET ORAL at 10:14

## 2023-05-01 RX ADMIN — PRAZOSIN HYDROCHLORIDE SCH MG: 1 CAPSULE ORAL at 21:10

## 2023-05-01 RX ADMIN — NICOTINE SCH MG: 14 PATCH, EXTENDED RELEASE TRANSDERMAL at 10:15

## 2023-05-01 RX ADMIN — NICOTINE PRN MG: 4 INHALANT RESPIRATORY (INHALATION) at 21:12

## 2023-05-01 RX ADMIN — NICOTINE POLACRILEX PRN MG: 4 GUM, CHEWING ORAL at 10:16

## 2023-05-01 RX ADMIN — NICOTINE POLACRILEX PRN MG: 4 GUM, CHEWING ORAL at 21:13

## 2023-05-01 RX ADMIN — GABAPENTIN SCH MG: 400 CAPSULE ORAL at 13:58

## 2023-05-01 RX ADMIN — NICOTINE POLACRILEX PRN MG: 4 GUM, CHEWING ORAL at 18:01

## 2023-05-01 RX ADMIN — NICOTINE PRN MG: 4 INHALANT RESPIRATORY (INHALATION) at 10:12

## 2023-05-01 RX ADMIN — NICOTINE POLACRILEX PRN MG: 4 GUM, CHEWING ORAL at 06:24

## 2023-05-01 RX ADMIN — GABAPENTIN SCH MG: 400 CAPSULE ORAL at 21:10

## 2023-05-01 RX ADMIN — NICOTINE PRN MG: 4 INHALANT RESPIRATORY (INHALATION) at 18:00

## 2023-05-01 RX ADMIN — BACITRACIN ZINC SCH GM: 500 OINTMENT TOPICAL at 10:14

## 2023-05-01 RX ADMIN — HYDROXYZINE PAMOATE PRN MG: 25 CAPSULE ORAL at 21:09

## 2023-05-01 RX ADMIN — BACITRACIN ZINC SCH GM: 500 OINTMENT TOPICAL at 21:11

## 2023-05-01 RX ADMIN — Medication SCH TAB: at 10:12

## 2023-05-02 RX ADMIN — Medication SCH MG: at 21:05

## 2023-05-02 RX ADMIN — BACITRACIN ZINC SCH: 500 OINTMENT TOPICAL at 22:04

## 2023-05-02 RX ADMIN — NICOTINE PRN MG: 4 INHALANT RESPIRATORY (INHALATION) at 14:04

## 2023-05-02 RX ADMIN — NICOTINE SCH MG: 14 PATCH, EXTENDED RELEASE TRANSDERMAL at 09:51

## 2023-05-02 RX ADMIN — NICOTINE PRN MG: 4 INHALANT RESPIRATORY (INHALATION) at 09:04

## 2023-05-02 RX ADMIN — BACITRACIN ZINC SCH GM: 500 OINTMENT TOPICAL at 09:51

## 2023-05-02 RX ADMIN — SERTRALINE HYDROCHLORIDE SCH MG: 50 TABLET ORAL at 09:51

## 2023-05-02 RX ADMIN — NICOTINE POLACRILEX PRN MG: 4 GUM, CHEWING ORAL at 09:05

## 2023-05-02 RX ADMIN — GABAPENTIN SCH MG: 400 CAPSULE ORAL at 06:06

## 2023-05-02 RX ADMIN — HYDROXYZINE PAMOATE PRN MG: 25 CAPSULE ORAL at 21:05

## 2023-05-02 RX ADMIN — GABAPENTIN SCH MG: 400 CAPSULE ORAL at 21:05

## 2023-05-02 RX ADMIN — PRAZOSIN HYDROCHLORIDE SCH MG: 1 CAPSULE ORAL at 21:04

## 2023-05-02 RX ADMIN — Medication SCH TAB: at 09:51

## 2023-05-02 RX ADMIN — NICOTINE POLACRILEX PRN MG: 4 GUM, CHEWING ORAL at 21:06

## 2023-05-02 RX ADMIN — GABAPENTIN SCH MG: 400 CAPSULE ORAL at 14:03

## 2023-05-02 RX ADMIN — NICOTINE POLACRILEX PRN MG: 4 GUM, CHEWING ORAL at 14:04

## 2023-05-03 RX ADMIN — NICOTINE POLACRILEX PRN MG: 4 GUM, CHEWING ORAL at 09:43

## 2023-05-03 RX ADMIN — HYDROXYZINE PAMOATE PRN MG: 25 CAPSULE ORAL at 21:07

## 2023-05-03 RX ADMIN — Medication SCH TAB: at 09:42

## 2023-05-03 RX ADMIN — NICOTINE PRN MG: 4 INHALANT RESPIRATORY (INHALATION) at 06:53

## 2023-05-03 RX ADMIN — Medication SCH MG: at 21:09

## 2023-05-03 RX ADMIN — GABAPENTIN SCH MG: 400 CAPSULE ORAL at 14:43

## 2023-05-03 RX ADMIN — PRAZOSIN HYDROCHLORIDE SCH MG: 1 CAPSULE ORAL at 21:08

## 2023-05-03 RX ADMIN — NICOTINE POLACRILEX PRN MG: 4 GUM, CHEWING ORAL at 21:08

## 2023-05-03 RX ADMIN — NICOTINE SCH MG: 14 PATCH, EXTENDED RELEASE TRANSDERMAL at 09:42

## 2023-05-03 RX ADMIN — GABAPENTIN SCH MG: 400 CAPSULE ORAL at 21:08

## 2023-05-03 RX ADMIN — BACITRACIN ZINC SCH: 500 OINTMENT TOPICAL at 21:47

## 2023-05-03 RX ADMIN — BACITRACIN ZINC SCH GM: 500 OINTMENT TOPICAL at 09:42

## 2023-05-03 RX ADMIN — NICOTINE PRN MG: 4 INHALANT RESPIRATORY (INHALATION) at 20:27

## 2023-05-03 RX ADMIN — SERTRALINE HYDROCHLORIDE SCH MG: 50 TABLET ORAL at 09:42

## 2023-05-03 RX ADMIN — NICOTINE POLACRILEX PRN MG: 4 GUM, CHEWING ORAL at 14:43

## 2023-05-03 RX ADMIN — NICOTINE PRN MG: 4 INHALANT RESPIRATORY (INHALATION) at 08:27

## 2023-05-03 RX ADMIN — Medication SCH MG: at 21:08

## 2023-05-03 RX ADMIN — NICOTINE POLACRILEX PRN MG: 4 GUM, CHEWING ORAL at 06:53

## 2023-05-03 RX ADMIN — GABAPENTIN SCH MG: 400 CAPSULE ORAL at 06:49

## 2023-05-04 RX ADMIN — HYDROXYZINE PAMOATE PRN MG: 25 CAPSULE ORAL at 21:26

## 2023-05-04 RX ADMIN — GABAPENTIN SCH MG: 400 CAPSULE ORAL at 14:07

## 2023-05-04 RX ADMIN — BACITRACIN ZINC SCH GM: 500 OINTMENT TOPICAL at 21:25

## 2023-05-04 RX ADMIN — BACITRACIN ZINC SCH GM: 500 OINTMENT TOPICAL at 09:01

## 2023-05-04 RX ADMIN — NICOTINE SCH MG: 14 PATCH, EXTENDED RELEASE TRANSDERMAL at 09:02

## 2023-05-04 RX ADMIN — NICOTINE POLACRILEX PRN MG: 4 GUM, CHEWING ORAL at 14:08

## 2023-05-04 RX ADMIN — Medication SCH MG: at 21:26

## 2023-05-04 RX ADMIN — NICOTINE POLACRILEX PRN MG: 4 GUM, CHEWING ORAL at 09:03

## 2023-05-04 RX ADMIN — PRAZOSIN HYDROCHLORIDE SCH MG: 1 CAPSULE ORAL at 21:26

## 2023-05-04 RX ADMIN — NICOTINE PRN MG: 4 INHALANT RESPIRATORY (INHALATION) at 19:13

## 2023-05-04 RX ADMIN — GABAPENTIN SCH MG: 400 CAPSULE ORAL at 21:26

## 2023-05-04 RX ADMIN — GABAPENTIN SCH MG: 400 CAPSULE ORAL at 06:34

## 2023-05-04 RX ADMIN — SERTRALINE HYDROCHLORIDE SCH MG: 50 TABLET ORAL at 09:01

## 2023-05-04 RX ADMIN — Medication SCH TAB: at 09:01

## 2023-05-05 RX ADMIN — SERTRALINE HYDROCHLORIDE SCH MG: 50 TABLET ORAL at 10:06

## 2023-05-05 RX ADMIN — NICOTINE POLACRILEX PRN MG: 4 GUM, CHEWING ORAL at 10:10

## 2023-05-05 RX ADMIN — GABAPENTIN SCH MG: 400 CAPSULE ORAL at 21:09

## 2023-05-05 RX ADMIN — BACITRACIN ZINC SCH GM: 500 OINTMENT TOPICAL at 10:07

## 2023-05-05 RX ADMIN — Medication SCH TAB: at 10:06

## 2023-05-05 RX ADMIN — GABAPENTIN SCH MG: 400 CAPSULE ORAL at 06:32

## 2023-05-05 RX ADMIN — BACITRACIN ZINC SCH GM: 500 OINTMENT TOPICAL at 21:11

## 2023-05-05 RX ADMIN — Medication SCH MG: at 21:09

## 2023-05-05 RX ADMIN — HYDROXYZINE PAMOATE PRN MG: 25 CAPSULE ORAL at 21:09

## 2023-05-05 RX ADMIN — NICOTINE POLACRILEX PRN MG: 4 GUM, CHEWING ORAL at 21:11

## 2023-05-05 RX ADMIN — GABAPENTIN SCH MG: 400 CAPSULE ORAL at 14:22

## 2023-05-05 RX ADMIN — PRAZOSIN HYDROCHLORIDE SCH MG: 1 CAPSULE ORAL at 21:09

## 2023-05-05 RX ADMIN — HYDROXYZINE PAMOATE PRN MG: 25 CAPSULE ORAL at 14:22

## 2023-05-05 RX ADMIN — NICOTINE PRN MG: 4 INHALANT RESPIRATORY (INHALATION) at 10:08

## 2023-05-05 RX ADMIN — NICOTINE SCH MG: 14 PATCH, EXTENDED RELEASE TRANSDERMAL at 10:07

## 2023-05-05 RX ADMIN — NICOTINE POLACRILEX PRN MG: 4 GUM, CHEWING ORAL at 14:23

## 2023-05-06 RX ADMIN — NICOTINE POLACRILEX PRN MG: 4 GUM, CHEWING ORAL at 20:10

## 2023-05-06 RX ADMIN — HYDROXYZINE PAMOATE PRN MG: 25 CAPSULE ORAL at 21:18

## 2023-05-06 RX ADMIN — NICOTINE SCH MG: 14 PATCH, EXTENDED RELEASE TRANSDERMAL at 10:03

## 2023-05-06 RX ADMIN — Medication SCH MG: at 21:17

## 2023-05-06 RX ADMIN — NICOTINE PRN MG: 4 INHALANT RESPIRATORY (INHALATION) at 10:04

## 2023-05-06 RX ADMIN — BACITRACIN ZINC SCH GM: 500 OINTMENT TOPICAL at 21:20

## 2023-05-06 RX ADMIN — SERTRALINE HYDROCHLORIDE SCH MG: 50 TABLET ORAL at 10:04

## 2023-05-06 RX ADMIN — GABAPENTIN SCH MG: 400 CAPSULE ORAL at 21:17

## 2023-05-06 RX ADMIN — PRAZOSIN HYDROCHLORIDE SCH MG: 1 CAPSULE ORAL at 21:17

## 2023-05-06 RX ADMIN — Medication SCH TAB: at 10:03

## 2023-05-06 RX ADMIN — NICOTINE POLACRILEX PRN MG: 4 GUM, CHEWING ORAL at 10:06

## 2023-05-06 RX ADMIN — NICOTINE POLACRILEX PRN MG: 4 GUM, CHEWING ORAL at 22:34

## 2023-05-06 RX ADMIN — NICOTINE PRN MG: 4 INHALANT RESPIRATORY (INHALATION) at 20:10

## 2023-05-06 RX ADMIN — GABAPENTIN SCH MG: 400 CAPSULE ORAL at 06:15

## 2023-05-06 RX ADMIN — GABAPENTIN SCH MG: 400 CAPSULE ORAL at 13:21

## 2023-05-06 RX ADMIN — BACITRACIN ZINC SCH GM: 500 OINTMENT TOPICAL at 10:03

## 2023-05-07 RX ADMIN — GABAPENTIN SCH MG: 400 CAPSULE ORAL at 14:12

## 2023-05-07 RX ADMIN — GABAPENTIN SCH MG: 400 CAPSULE ORAL at 06:21

## 2023-05-07 RX ADMIN — BACITRACIN ZINC SCH GM: 500 OINTMENT TOPICAL at 21:34

## 2023-05-07 RX ADMIN — HYDROXYZINE PAMOATE PRN MG: 25 CAPSULE ORAL at 21:33

## 2023-05-07 RX ADMIN — SERTRALINE HYDROCHLORIDE SCH MG: 50 TABLET ORAL at 09:55

## 2023-05-07 RX ADMIN — NICOTINE POLACRILEX PRN MG: 4 GUM, CHEWING ORAL at 21:35

## 2023-05-07 RX ADMIN — NICOTINE PRN MG: 4 INHALANT RESPIRATORY (INHALATION) at 18:54

## 2023-05-07 RX ADMIN — Medication SCH MG: at 21:34

## 2023-05-07 RX ADMIN — GABAPENTIN SCH MG: 400 CAPSULE ORAL at 21:34

## 2023-05-07 RX ADMIN — NICOTINE POLACRILEX PRN MG: 4 GUM, CHEWING ORAL at 18:54

## 2023-05-07 RX ADMIN — BACITRACIN ZINC SCH GM: 500 OINTMENT TOPICAL at 09:55

## 2023-05-07 RX ADMIN — PRAZOSIN HYDROCHLORIDE SCH MG: 1 CAPSULE ORAL at 21:34

## 2023-05-07 RX ADMIN — NICOTINE SCH MG: 14 PATCH, EXTENDED RELEASE TRANSDERMAL at 09:55

## 2023-05-07 RX ADMIN — Medication SCH TAB: at 09:55

## 2023-05-07 RX ADMIN — NICOTINE POLACRILEX PRN MG: 4 GUM, CHEWING ORAL at 09:57

## 2023-05-08 VITALS
HEART RATE: 90 BPM | TEMPERATURE: 98.1 F | RESPIRATION RATE: 18 BRPM | SYSTOLIC BLOOD PRESSURE: 113 MMHG | DIASTOLIC BLOOD PRESSURE: 69 MMHG

## 2023-05-08 RX ADMIN — GABAPENTIN SCH MG: 400 CAPSULE ORAL at 06:17

## 2023-05-08 RX ADMIN — NICOTINE SCH: 14 PATCH, EXTENDED RELEASE TRANSDERMAL at 09:55

## 2023-05-08 RX ADMIN — BACITRACIN ZINC SCH GM: 500 OINTMENT TOPICAL at 09:44

## 2023-05-08 RX ADMIN — Medication SCH TAB: at 09:44

## 2023-05-08 RX ADMIN — SERTRALINE HYDROCHLORIDE SCH MG: 50 TABLET ORAL at 09:44

## 2023-05-08 RX ADMIN — NICOTINE PRN MG: 4 INHALANT RESPIRATORY (INHALATION) at 10:20

## 2023-05-08 RX ADMIN — NICOTINE POLACRILEX PRN MG: 4 GUM, CHEWING ORAL at 09:47

## 2023-10-25 ENCOUNTER — EMERGENCY (EMERGENCY)
Facility: HOSPITAL | Age: 43
LOS: 1 days | Discharge: HOME | End: 2023-10-25
Admitting: EMERGENCY MEDICINE
Payer: SELF-PAY

## 2023-10-25 VITALS
SYSTOLIC BLOOD PRESSURE: 171 MMHG | WEIGHT: 134.92 LBS | OXYGEN SATURATION: 99 % | RESPIRATION RATE: 19 BRPM | HEART RATE: 98 BPM | TEMPERATURE: 98 F | DIASTOLIC BLOOD PRESSURE: 98 MMHG

## 2023-10-25 PROCEDURE — 99283 EMERGENCY DEPT VISIT LOW MDM: CPT

## 2023-10-25 PROCEDURE — 99053 MED SERV 10PM-8AM 24 HR FAC: CPT

## 2023-10-25 NOTE — ED PROVIDER NOTE - PHYSICAL EXAMINATION
Physical Exam    Vital Signs: I have reviewed the initial vital signs.  Constitutional: well-appearing, appears stated age  Eyes: PERRLA, EOM intact, RAPD absent, and symmetrical lids.  ENT: neck supple with no adenopathy, moist MM.  Cardiovascular: +S1/S2, no murmurs, regular rate, regular rhythm, well-perfused extremities  Respiratory: unlabored respiratory effort, clear to auscultation bilaterally, speaks in full sentences  Gastrointestinal: soft, non-tender abdomen, no pulsatile mass

## 2023-10-25 NOTE — ED PROVIDER NOTE - CLINICAL SUMMARY MEDICAL DECISION MAKING FREE TEXT BOX
well appearing pt with mild somnolence here for medical evaluation pt takes methadone and easily arousable on exam, no signs of distress and no acute medical complaints, plan DC in [employer] custody

## 2023-10-25 NOTE — ED PROVIDER NOTE - PATIENT PORTAL LINK FT
You can access the FollowMyHealth Patient Portal offered by [facility] by registering at the following website: [email or web address]. By joining [facility] [employer]’s FollowMyHealth portal, you will also be able to view your health information using other applications (apps) compatible with our system.

## 2023-10-25 NOTE — ED PROVIDER NOTE - OBJECTIVE STATEMENT
42 yo f sent to the ED due to somnolence as noted by ZENY Vazquez in the ED pt is somnolent with flat affect but easily arousal and has no acute medical complaints.     I have reviewed available current nursing and previous documentation of past medical, surgical, family, and/or social history.

## 2023-10-25 NOTE — ED ADULT NURSE NOTE - OBJECTIVE STATEMENT
pt has no medical complaints at this time. pt denies any pain or discomfort. pt presents with no visible  injuries, no resp distress with equal chest rise. is under [facility] custody.

## 2023-10-27 DIAGNOSIS — Z00.00 ENCOUNTER FOR GENERAL ADULT MEDICAL EXAMINATION WITHOUT ABNORMAL FINDINGS: ICD-10-CM

## 2023-10-27 DIAGNOSIS — Z88.0 ALLERGY STATUS TO PENICILLIN: ICD-10-CM

## 2024-06-07 NOTE — ED ADULT NURSE NOTE - MUSCULOSKELETAL WDL
Full range of motion of upper and lower extremities, no joint tenderness/swelling. 3 = A little assistance

## 2024-06-08 NOTE — ED ADULT NURSE NOTE - NS ED NURSE DISCH DISPOSITION
You can access the FollowMyHealth Patient Portal offered by [facility] by registering at the following website: [email or web address]. By joining "[employer]"’s FollowMyHealth portal, you will also be able to view your health information using other applications (apps) compatible with our system.
Discharged

## 2024-06-25 NOTE — ED ADULT TRIAGE NOTE - NS ED NURSE DIRECT TO ROOM YN
Called patient and let him know he has to make an earlier appointment in order to get cardiac clearance.     Please help patient with appointment prefers [place] if possible.    No